# Patient Record
Sex: FEMALE | Race: WHITE | NOT HISPANIC OR LATINO | Employment: FULL TIME | URBAN - METROPOLITAN AREA
[De-identification: names, ages, dates, MRNs, and addresses within clinical notes are randomized per-mention and may not be internally consistent; named-entity substitution may affect disease eponyms.]

---

## 2022-01-15 ENCOUNTER — HOSPITAL ENCOUNTER (EMERGENCY)
Facility: CLINIC | Age: 32
End: 2022-01-15

## 2022-01-15 ENCOUNTER — HOSPITAL ENCOUNTER (EMERGENCY)
Facility: CLINIC | Age: 32
Discharge: HOME | End: 2022-01-15
Attending: EMERGENCY MEDICINE | Admitting: EMERGENCY MEDICINE
Payer: COMMERCIAL

## 2022-01-15 ENCOUNTER — APPOINTMENT (OUTPATIENT)
Dept: ULTRASOUND IMAGING | Facility: CLINIC | Age: 32
End: 2022-01-15
Attending: EMERGENCY MEDICINE
Payer: COMMERCIAL

## 2022-01-15 VITALS
DIASTOLIC BLOOD PRESSURE: 96 MMHG | HEIGHT: 62 IN | TEMPERATURE: 98 F | RESPIRATION RATE: 18 BRPM | HEART RATE: 101 BPM | WEIGHT: 145 LBS | BODY MASS INDEX: 26.68 KG/M2 | SYSTOLIC BLOOD PRESSURE: 138 MMHG | OXYGEN SATURATION: 98 %

## 2022-01-15 LAB
B-HCG SERPL-ACNC: ABNORMAL IU/L (ref 0–5)
BASOPHILS # BLD AUTO: 0.1 10E3/UL (ref 0–0.2)
BASOPHILS NFR BLD AUTO: 1 %
EOSINOPHIL # BLD AUTO: 0.1 10E3/UL (ref 0–0.7)
EOSINOPHIL NFR BLD AUTO: 1 %
ERYTHROCYTE [DISTWIDTH] IN BLOOD BY AUTOMATED COUNT: 11.9 % (ref 10–15)
HCT VFR BLD AUTO: 42.4 % (ref 35–47)
HGB BLD-MCNC: 14.6 G/DL (ref 11.7–15.7)
HOLD SPECIMEN: NORMAL
IMM GRANULOCYTES # BLD: 0.1 10E3/UL
IMM GRANULOCYTES NFR BLD: 1 %
LYMPHOCYTES # BLD AUTO: 1.9 10E3/UL (ref 0.8–5.3)
LYMPHOCYTES NFR BLD AUTO: 18 %
MCH RBC QN AUTO: 29.9 PG (ref 26.5–33)
MCHC RBC AUTO-ENTMCNC: 34.4 G/DL (ref 31.5–36.5)
MCV RBC AUTO: 87 FL (ref 78–100)
MONOCYTES # BLD AUTO: 0.5 10E3/UL (ref 0–1.3)
MONOCYTES NFR BLD AUTO: 5 %
NEUTROPHILS # BLD AUTO: 7.9 10E3/UL (ref 1.6–8.3)
NEUTROPHILS NFR BLD AUTO: 74 %
NRBC # BLD AUTO: 0 10E3/UL
NRBC BLD AUTO-RTO: 0 /100
PLATELET # BLD AUTO: 286 10E3/UL (ref 150–450)
RBC # BLD AUTO: 4.89 10E6/UL (ref 3.8–5.2)
WBC # BLD AUTO: 10.5 10E3/UL (ref 4–11)

## 2022-01-15 PROCEDURE — 99284 EMERGENCY DEPT VISIT MOD MDM: CPT | Mod: 25

## 2022-01-15 PROCEDURE — 36415 COLL VENOUS BLD VENIPUNCTURE: CPT | Performed by: EMERGENCY MEDICINE

## 2022-01-15 PROCEDURE — 84702 CHORIONIC GONADOTROPIN TEST: CPT | Performed by: EMERGENCY MEDICINE

## 2022-01-15 PROCEDURE — 85025 COMPLETE CBC W/AUTO DIFF WBC: CPT | Performed by: EMERGENCY MEDICINE

## 2022-01-15 PROCEDURE — 76801 OB US < 14 WKS SINGLE FETUS: CPT

## 2022-01-15 ASSESSMENT — MIFFLIN-ST. JEOR: SCORE: 1325.97

## 2022-01-15 NOTE — ED PROVIDER NOTES
"  History     Chief Complaint:    Vaginal Bleeding       HPI   Corinne Mclain is a 31 year old female who presents with bleeding in the first trimester of her pregnancy.  Patient states she had some bleeding couple weeks ago that resolved.  She saw her obstetrician at that time and was diagnosed with a subchorionic hemorrhage.  Patient reports her Rh type is positive.  She is very confident about that.  She denies any pain symptoms denies fevers or lightheadedness or dizziness.  This is her first pregnancy.    Allergies:  Sulfa Drugs     Medications:    No current outpatient medications on file.      Past Medical History:    No past medical history on file.    There are no problems to display for this patient.       Past Surgical History:    No past surgical history on file.     Family History:    family history is not on file.    Social History:   nonsmoker    PCP: [facility], [place]     Review of Systems   All other systems reviewed and are negative.        Physical Exam   Patient Vitals for the past 24 hrs:   BP Temp Temp src Pulse Resp SpO2 Height Weight   01/15/22 0950 (!) 143/87 98  F (36.7  C) Oral 92 18 100 % 1.575 m (5' 2\") 65.8 kg (145 lb)        Physical Exam    Gen: well appearing, in no acute distress  HENT: Mucous membranes moist, no rhinorrhea  Eyes: periorbital tissues and sclera normal   Neck: supple, no abnormal swelling  Lungs: no resp distress, speaks full sentences  CV: Regular rate  Abd: soft, nontender, nondistended, no rebound/guarding  Skin: warm, dry, well perfused, no rashes/bruising/lesions on exposed skin  Neuro: alert, mo gross motor or sensory deficits,   Psych: pleasant mood, normal affect      Emergency Department Course       Imaging:    US OB < 14 Weeks Single   Preliminary Result   IMPRESSION:    1.  Single living intrauterine gestation at 11 weeks 6 days, EDC 07/31/2022.   2.  Subchorionic hemorrhage.              Laboratory:    Labs Ordered and Resulted from Time of ED " Arrival to Time of ED Departure   HCG QUANTITATIVE PREGNANCY - Abnormal       Result Value    hCG Quantitative 102,650 (*)    CBC WITH PLATELETS AND DIFFERENTIAL    WBC Count 10.5      RBC Count 4.89      Hemoglobin 14.6      Hematocrit 42.4      MCV 87      MCH 29.9      MCHC 34.4      RDW 11.9      Platelet Count 286      % Neutrophils 74      % Lymphocytes 18      % Monocytes 5      % Eosinophils 1      % Basophils 1      % Immature Granulocytes 1      NRBCs per 100 WBC 0      Absolute Neutrophils 7.9      Absolute Lymphocytes 1.9      Absolute Monocytes 0.5      Absolute Eosinophils 0.1      Absolute Basophils 0.1      Absolute Immature Granulocytes 0.1      Absolute NRBCs 0.0     ABO AND RH        Procedures:  Interventions:    Medications - No data to display     Emergency Department Course:  Past medical records, nursing notes, and vitals reviewed.  I performed an exam of the patient and obtained history, as documented above.    I rechecked the patient. Findings and plan explained to the Patient    Impression & Plan         Medical Decision Making:  Patient presents with bleeding in the first trimester.  Painless.  Ultrasound shows subchorionic hemorrhage.  Otherwise fetus appears normal.  Patient is confident her Rh type is positive.  She has follow-up with her gynecologist this next week.  Will recommend pelvic rest for now reassurance.  Recommend return to ED with worsening pain worsening bleeding dizziness lightheadedness she is comfortable with management plan.      Diagnosis:    ICD-10-CM    1. Subchorionic hemorrhage of placenta in first trimester, single or unspecified fetus  O41.8X10     O46.8X1         Discharge Medications:  New Prescriptions    No medications on file        1/15/2022   Qasim Ying,*        Qasim Ying MD  01/15/22 0854

## 2022-01-15 NOTE — ED TRIAGE NOTES
Patient reports being 12 weeks pregnant and this AM start vaginal bleeding and passing clots. States bleeding is heavy. Denies abdominal pain.

## 2022-01-25 LAB
HEPATITIS B SURFACE ANTIGEN (EXTERNAL): NEGATIVE
HIV1+2 AB SERPL QL IA: NEGATIVE
RUBELLA ANTIBODY IGG (EXTERNAL): NORMAL

## 2022-07-08 LAB — GROUP B STREPTOCOCCUS (EXTERNAL): NEGATIVE

## 2022-07-22 ENCOUNTER — HOSPITAL ENCOUNTER (OUTPATIENT)
Age: 32
End: 2022-07-22
Payer: COMMERCIAL

## 2022-08-08 ENCOUNTER — ANESTHESIA EVENT (OUTPATIENT)
Dept: OBGYN | Facility: CLINIC | Age: 32
End: 2022-08-08
Payer: COMMERCIAL

## 2022-08-08 ENCOUNTER — HOSPITAL ENCOUNTER (INPATIENT)
Facility: CLINIC | Age: 32
LOS: 2 days | Discharge: HOME | End: 2022-08-10
Attending: OBSTETRICS & GYNECOLOGY | Admitting: OBSTETRICS & GYNECOLOGY
Payer: COMMERCIAL

## 2022-08-08 ENCOUNTER — ANESTHESIA (OUTPATIENT)
Dept: OBGYN | Facility: CLINIC | Age: 32
End: 2022-08-08
Payer: COMMERCIAL

## 2022-08-08 LAB
ABO/RH(D): NORMAL
ANTIBODY SCREEN: NEGATIVE
HGB BLD-MCNC: 13.3 G/DL (ref 11.7–15.7)
SARS-COV-2 RNA RESP QL NAA+PROBE: NEGATIVE
SPECIMEN EXPIRATION DATE: NORMAL
T PALLIDUM AB SER QL: NONREACTIVE

## 2022-08-08 PROCEDURE — 10907ZC DRAINAGE OF AMNIOTIC FLUID, THERAPEUTIC FROM PRODUCTS OF CONCEPTION, VIA NATURAL OR ARTIFICIAL OPENING: ICD-10-PCS | Performed by: OBSTETRICS & GYNECOLOGY

## 2022-08-08 PROCEDURE — 86780 TREPONEMA PALLIDUM: CPT | Performed by: OBSTETRICS & GYNECOLOGY

## 2022-08-08 PROCEDURE — 85018 HEMOGLOBIN: CPT | Performed by: OBSTETRICS & GYNECOLOGY

## 2022-08-08 PROCEDURE — 370N000003 HC ANESTHESIA WARD SERVICE

## 2022-08-08 PROCEDURE — 86901 BLOOD TYPING SEROLOGIC RH(D): CPT | Performed by: OBSTETRICS & GYNECOLOGY

## 2022-08-08 PROCEDURE — 722N000001 HC LABOR CARE VAGINAL DELIVERY SINGLE

## 2022-08-08 PROCEDURE — 3E0R3BZ INTRODUCTION OF ANESTHETIC AGENT INTO SPINAL CANAL, PERCUTANEOUS APPROACH: ICD-10-PCS | Performed by: ANESTHESIOLOGY

## 2022-08-08 PROCEDURE — 00HU33Z INSERTION OF INFUSION DEVICE INTO SPINAL CANAL, PERCUTANEOUS APPROACH: ICD-10-PCS | Performed by: ANESTHESIOLOGY

## 2022-08-08 PROCEDURE — U0003 INFECTIOUS AGENT DETECTION BY NUCLEIC ACID (DNA OR RNA); SEVERE ACUTE RESPIRATORY SYNDROME CORONAVIRUS 2 (SARS-COV-2) (CORONAVIRUS DISEASE [COVID-19]), AMPLIFIED PROBE TECHNIQUE, MAKING USE OF HIGH THROUGHPUT TECHNOLOGIES AS DESCRIBED BY CMS-2020-01-R: HCPCS | Performed by: OBSTETRICS & GYNECOLOGY

## 2022-08-08 PROCEDURE — 250N000011 HC RX IP 250 OP 636: Performed by: OBSTETRICS & GYNECOLOGY

## 2022-08-08 PROCEDURE — 120N000001 HC R&B MED SURG/OB

## 2022-08-08 PROCEDURE — 250N000011 HC RX IP 250 OP 636: Performed by: ANESTHESIOLOGY

## 2022-08-08 PROCEDURE — 86850 RBC ANTIBODY SCREEN: CPT | Performed by: OBSTETRICS & GYNECOLOGY

## 2022-08-08 PROCEDURE — 250N000009 HC RX 250: Performed by: OBSTETRICS & GYNECOLOGY

## 2022-08-08 PROCEDURE — 258N000003 HC RX IP 258 OP 636: Performed by: OBSTETRICS & GYNECOLOGY

## 2022-08-08 PROCEDURE — 3E033VJ INTRODUCTION OF OTHER HORMONE INTO PERIPHERAL VEIN, PERCUTANEOUS APPROACH: ICD-10-PCS | Performed by: OBSTETRICS & GYNECOLOGY

## 2022-08-08 RX ORDER — NALOXONE HYDROCHLORIDE 0.4 MG/ML
0.4 INJECTION, SOLUTION INTRAMUSCULAR; INTRAVENOUS; SUBCUTANEOUS
Status: DISCONTINUED | OUTPATIENT
Start: 2022-08-08 | End: 2022-08-09 | Stop reason: HOSPADM

## 2022-08-08 RX ORDER — PROCHLORPERAZINE MALEATE 10 MG
10 TABLET ORAL EVERY 6 HOURS PRN
Status: DISCONTINUED | OUTPATIENT
Start: 2022-08-08 | End: 2022-08-09 | Stop reason: HOSPADM

## 2022-08-08 RX ORDER — METOCLOPRAMIDE HYDROCHLORIDE 5 MG/ML
10 INJECTION INTRAMUSCULAR; INTRAVENOUS EVERY 6 HOURS PRN
Status: DISCONTINUED | OUTPATIENT
Start: 2022-08-08 | End: 2022-08-09 | Stop reason: HOSPADM

## 2022-08-08 RX ORDER — FENTANYL CITRATE-0.9 % NACL/PF 10 MCG/ML
100 PLASTIC BAG, INJECTION (ML) INTRAVENOUS EVERY 5 MIN PRN
Status: DISCONTINUED | OUTPATIENT
Start: 2022-08-08 | End: 2022-08-09 | Stop reason: HOSPADM

## 2022-08-08 RX ORDER — SODIUM CHLORIDE, SODIUM LACTATE, POTASSIUM CHLORIDE, CALCIUM CHLORIDE 600; 310; 30; 20 MG/100ML; MG/100ML; MG/100ML; MG/100ML
INJECTION, SOLUTION INTRAVENOUS CONTINUOUS
Status: DISCONTINUED | OUTPATIENT
Start: 2022-08-08 | End: 2022-08-09 | Stop reason: HOSPADM

## 2022-08-08 RX ORDER — CITRIC ACID/SODIUM CITRATE 334-500MG
30 SOLUTION, ORAL ORAL
Status: DISCONTINUED | OUTPATIENT
Start: 2022-08-08 | End: 2022-08-09 | Stop reason: HOSPADM

## 2022-08-08 RX ORDER — IBUPROFEN 800 MG/1
800 TABLET, FILM COATED ORAL
Status: DISCONTINUED | OUTPATIENT
Start: 2022-08-08 | End: 2022-08-09

## 2022-08-08 RX ORDER — OXYTOCIN/0.9 % SODIUM CHLORIDE 30/500 ML
340 PLASTIC BAG, INJECTION (ML) INTRAVENOUS CONTINUOUS PRN
Status: DISCONTINUED | OUTPATIENT
Start: 2022-08-08 | End: 2022-08-09 | Stop reason: HOSPADM

## 2022-08-08 RX ORDER — ONDANSETRON 2 MG/ML
4 INJECTION INTRAMUSCULAR; INTRAVENOUS EVERY 6 HOURS PRN
Status: DISCONTINUED | OUTPATIENT
Start: 2022-08-08 | End: 2022-08-09 | Stop reason: HOSPADM

## 2022-08-08 RX ORDER — NALBUPHINE HYDROCHLORIDE 10 MG/ML
2.5-5 INJECTION, SOLUTION INTRAMUSCULAR; INTRAVENOUS; SUBCUTANEOUS EVERY 6 HOURS PRN
Status: DISCONTINUED | OUTPATIENT
Start: 2022-08-08 | End: 2022-08-09

## 2022-08-08 RX ORDER — MISOPROSTOL 200 UG/1
400 TABLET ORAL
Status: DISCONTINUED | OUTPATIENT
Start: 2022-08-08 | End: 2022-08-09 | Stop reason: HOSPADM

## 2022-08-08 RX ORDER — NALOXONE HYDROCHLORIDE 0.4 MG/ML
0.2 INJECTION, SOLUTION INTRAMUSCULAR; INTRAVENOUS; SUBCUTANEOUS
Status: DISCONTINUED | OUTPATIENT
Start: 2022-08-08 | End: 2022-08-09 | Stop reason: HOSPADM

## 2022-08-08 RX ORDER — SODIUM CHLORIDE, SODIUM LACTATE, POTASSIUM CHLORIDE, CALCIUM CHLORIDE 600; 310; 30; 20 MG/100ML; MG/100ML; MG/100ML; MG/100ML
INJECTION, SOLUTION INTRAVENOUS CONTINUOUS PRN
Status: DISCONTINUED | OUTPATIENT
Start: 2022-08-08 | End: 2022-08-09 | Stop reason: HOSPADM

## 2022-08-08 RX ORDER — KETOROLAC TROMETHAMINE 30 MG/ML
30 INJECTION, SOLUTION INTRAMUSCULAR; INTRAVENOUS
Status: DISCONTINUED | OUTPATIENT
Start: 2022-08-08 | End: 2022-08-09

## 2022-08-08 RX ORDER — CARBOPROST TROMETHAMINE 250 UG/ML
250 INJECTION, SOLUTION INTRAMUSCULAR
Status: DISCONTINUED | OUTPATIENT
Start: 2022-08-08 | End: 2022-08-09 | Stop reason: HOSPADM

## 2022-08-08 RX ORDER — FENTANYL CITRATE 50 UG/ML
100 INJECTION, SOLUTION INTRAMUSCULAR; INTRAVENOUS
Status: DISCONTINUED | OUTPATIENT
Start: 2022-08-08 | End: 2022-08-09 | Stop reason: HOSPADM

## 2022-08-08 RX ORDER — LIDOCAINE 40 MG/G
CREAM TOPICAL
Status: DISCONTINUED | OUTPATIENT
Start: 2022-08-08 | End: 2022-08-09 | Stop reason: HOSPADM

## 2022-08-08 RX ORDER — OXYTOCIN/0.9 % SODIUM CHLORIDE 30/500 ML
100-340 PLASTIC BAG, INJECTION (ML) INTRAVENOUS CONTINUOUS PRN
Status: DISCONTINUED | OUTPATIENT
Start: 2022-08-08 | End: 2022-08-09

## 2022-08-08 RX ORDER — OXYTOCIN 10 [USP'U]/ML
10 INJECTION, SOLUTION INTRAMUSCULAR; INTRAVENOUS
Status: DISCONTINUED | OUTPATIENT
Start: 2022-08-08 | End: 2022-08-09 | Stop reason: HOSPADM

## 2022-08-08 RX ORDER — OXYTOCIN 10 [USP'U]/ML
10 INJECTION, SOLUTION INTRAMUSCULAR; INTRAVENOUS
Status: DISCONTINUED | OUTPATIENT
Start: 2022-08-08 | End: 2022-08-09

## 2022-08-08 RX ORDER — MISOPROSTOL 200 UG/1
800 TABLET ORAL
Status: DISCONTINUED | OUTPATIENT
Start: 2022-08-08 | End: 2022-08-09 | Stop reason: HOSPADM

## 2022-08-08 RX ORDER — PROCHLORPERAZINE 25 MG
25 SUPPOSITORY, RECTAL RECTAL EVERY 12 HOURS PRN
Status: DISCONTINUED | OUTPATIENT
Start: 2022-08-08 | End: 2022-08-09 | Stop reason: HOSPADM

## 2022-08-08 RX ORDER — TRANEXAMIC ACID 10 MG/ML
1 INJECTION, SOLUTION INTRAVENOUS EVERY 30 MIN PRN
Status: DISCONTINUED | OUTPATIENT
Start: 2022-08-08 | End: 2022-08-09 | Stop reason: HOSPADM

## 2022-08-08 RX ORDER — METHYLERGONOVINE MALEATE 0.2 MG/ML
200 INJECTION INTRAVENOUS
Status: DISCONTINUED | OUTPATIENT
Start: 2022-08-08 | End: 2022-08-09 | Stop reason: HOSPADM

## 2022-08-08 RX ORDER — OXYTOCIN/0.9 % SODIUM CHLORIDE 30/500 ML
1-24 PLASTIC BAG, INJECTION (ML) INTRAVENOUS CONTINUOUS
Status: DISCONTINUED | OUTPATIENT
Start: 2022-08-08 | End: 2022-08-09 | Stop reason: HOSPADM

## 2022-08-08 RX ORDER — ONDANSETRON 4 MG/1
4 TABLET, ORALLY DISINTEGRATING ORAL EVERY 6 HOURS PRN
Status: DISCONTINUED | OUTPATIENT
Start: 2022-08-08 | End: 2022-08-09 | Stop reason: HOSPADM

## 2022-08-08 RX ORDER — PRENATAL VIT/IRON FUM/FOLIC AC 27MG-0.8MG
1 TABLET ORAL DAILY
COMMUNITY

## 2022-08-08 RX ORDER — METOCLOPRAMIDE 10 MG/1
10 TABLET ORAL EVERY 6 HOURS PRN
Status: DISCONTINUED | OUTPATIENT
Start: 2022-08-08 | End: 2022-08-09 | Stop reason: HOSPADM

## 2022-08-08 RX ADMIN — Medication: at 21:04

## 2022-08-08 RX ADMIN — ONDANSETRON 4 MG: 2 INJECTION INTRAMUSCULAR; INTRAVENOUS at 20:38

## 2022-08-08 RX ADMIN — SODIUM CHLORIDE, POTASSIUM CHLORIDE, SODIUM LACTATE AND CALCIUM CHLORIDE: 600; 310; 30; 20 INJECTION, SOLUTION INTRAVENOUS at 15:41

## 2022-08-08 RX ADMIN — Medication: at 14:35

## 2022-08-08 RX ADMIN — FENTANYL CITRATE 100 MCG: 50 INJECTION, SOLUTION INTRAMUSCULAR; INTRAVENOUS at 13:15

## 2022-08-08 RX ADMIN — SODIUM CHLORIDE, POTASSIUM CHLORIDE, SODIUM LACTATE AND CALCIUM CHLORIDE: 600; 310; 30; 20 INJECTION, SOLUTION INTRAVENOUS at 08:55

## 2022-08-08 RX ADMIN — ONDANSETRON 4 MG: 2 INJECTION INTRAMUSCULAR; INTRAVENOUS at 14:34

## 2022-08-08 RX ADMIN — Medication 2 MILLI-UNITS/MIN: at 08:50

## 2022-08-08 ASSESSMENT — ACTIVITIES OF DAILY LIVING (ADL)
ADLS_ACUITY_SCORE: 20

## 2022-08-08 NOTE — ANESTHESIA PREPROCEDURE EVALUATION
Anesthesia Pre-Procedure Evaluation    Patient: Corinne Mclain   MRN: [id number] : 1990        Procedure :           History reviewed. No pertinent past medical history.   History reviewed. No pertinent surgical history.   Allergies   Allergen Reactions     Sulfa Drugs Hives      Social History     Tobacco Use     Smoking status: Never Smoker     Smokeless tobacco: Never Used   Substance Use Topics     Alcohol use: Never      Wt Readings from Last 1 Encounters:   01/15/22 65.8 kg (145 lb)        Anesthesia Evaluation            ROS/MED HX  ENT/Pulmonary:  - neg pulmonary ROS     Neurologic:       Cardiovascular:  - neg cardiovascular ROS     METS/Exercise Tolerance:     Hematologic:       Musculoskeletal:       GI/Hepatic:       Renal/Genitourinary:       Endo:       Psychiatric/Substance Use:       Infectious Disease:       Malignancy:       Other:    41w0d with Estimated Date of Delivery: Aug 1, 2022 who presents for post dates induction            OUTSIDE LABS:  CBC:   Lab Results   Component Value Date    WBC 10.5 01/15/2022    HGB 13.3 2022    HGB 14.6 01/15/2022    HCT 42.4 01/15/2022     01/15/2022     BMP: No results found for: NA, POTASSIUM, CHLORIDE, CO2, BUN, CR, GLC  COAGS: No results found for: PTT, INR, FIBR  POC: No results found for: BGM, HCG, HCGS  HEPATIC: No results found for: ALBUMIN, PROTTOTAL, ALT, AST, GGT, ALKPHOS, BILITOTAL, BILIDIRECT, REJI  OTHER: No results found for: PH, LACT, A1C, ALXEEI, PHOS, MAG, LIPASE, AMYLASE, TSH, T4, T3, CRP, SED    Anesthesia Plan    ASA Status:  2      Anesthesia Type: Epidural.              Consents    Anesthesia Plan(s) and associated risks, benefits, and realistic alternatives discussed. Questions answered and patient/representative(s) expressed understanding.    - Discussed:     - Discussed with:  Patient         Postoperative Care            Comments:    Other Comments: Continuous Labor Epidural: Indication is for labor pain.  Following an discussion of the procedure, epidural expectations, and risks and benefits (risks including, but not limited to, nerve damage, infection, bleeding/hematoma, inadvertent dural puncture, spinal headache, partial or failed block), the patient appears to understand and consents to proceed. Questions were encouraged and answered.             Deandra Morales MD

## 2022-08-08 NOTE — PLAN OF CARE
arrives to floor for medical induction for postdates. Pt states baby is active. Denies LOF, vaginal bleeding, and contractions. Monitors applied, admission information gathered, assessment performed and IV started. Will call MD for further orders.

## 2022-08-08 NOTE — PROVIDER NOTIFICATION
08/08/22 1300   Provider Notification   Provider Name/Title Dr Perdue   Method of Notification In Department   Request Evaluate - Remote   Notification Reason Status Update     MD in department. Updated on FHR tracing, pt comfort level and pitocin. Orders to recheck pt at 1430 or after epidural placement. Call if needed.

## 2022-08-08 NOTE — PROVIDER NOTIFICATION
08/08/22 0820   Provider Notification   Provider Name/Title Dr Perdue   Method of Notification At Bedside   Request Evaluate in Person   Notification Reason Patient Arrived     MD at bedside. Reviewed FHR tracing. SVE performed by MD 3/80/-2. AROM clear fluid. Intrapartum orders received MD ok with nitrous, fentanyl or epidural at anytime. Orders to start pitocin. MD updated on admission BP no new orders. MD is in house today, call when needed

## 2022-08-08 NOTE — PROVIDER NOTIFICATION
08/08/22 1740   Provider Notification   Provider Name/Title Dr Perdue   Method of Notification In Department   Request Evaluate in Person   Notification Reason Status Update     MD in department, notified of SVE and bloody show. Orders to check pt again when she is feeling pressure or if indicated due to FHR tracing. Updated of decel when trying to flip pt to right side, so we flipped her back to left side. Ok to keep pt on right side. MD is in department, call when needed

## 2022-08-08 NOTE — PROVIDER NOTIFICATION
08/08/22 1620   Provider Notification   Provider Name/Title Dr Perdue   Method of Notification At Bedside   Request Evaluate in Person       MD called to bedside to assess vaginal bleeding. Reviewed FHR tracing and bleeding.   No new orders, continue to monitor.

## 2022-08-08 NOTE — H&P
[facility]     History and Physical  Obstetrics and Gynecology     Date of Admission:  2022    History of Present Illness   Corinne Mclain is a 31 year old female  41w0d with Estimated Date of Delivery: Aug 1, 2022 who presents for post dates induction.    [facility].  Indication post dates.    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  Rhogam not indicated   No lab results found.    Past Medical History    I have reviewed this patient's medical history and updated it with pertinent information if needed.   History reviewed. No pertinent past medical history.    Past Surgical History   I have reviewed this patient's surgical history and updated it with pertinent information if needed.  History reviewed. No pertinent surgical history.    Prior to Admission Medications   Prior to Admission Medications   Prescriptions Last Dose Informant Patient Reported? Taking?   Prenatal Vit-Fe Fumarate-FA (PRENATAL MULTIVITAMIN W/IRON) 27-0.8 MG tablet 2022 at Unknown time  Yes Yes   Sig: Take 1 tablet by mouth daily      Facility-Administered Medications: None     Allergies   Allergies   Allergen Reactions     Sulfa Drugs Hives       Social History   I have personally reviewed the social history.    Family History   Family history reviewed with patient and is noncontributory.    Immunization History   Immunizations are up to date  S/p flu 10/20/21, Tdap 22, anc COVID vacc      Physical Exam   Temp: 98.5  F (36.9  C) Temp src: Oral BP: (!) 138/91     Resp: 16        Vital Signs with Ranges  Temp:  [98.5  F (36.9  C)] 98.5  F (36.9  C)  Resp:  [16] 16  BP: (138)/(91) 138/91  Fetal Heart Tones: 135 baseline, moderate variablility, + accels, no decels and Category  I  TOCO: none    Constitutional: healthy, alert and active   Respiratory: No increased work of breathing, good air exchange, clear to auscultation bilaterally, no crackles or wheezing  Cardiovascular: Normal apical impulse, regular rate and rhythm, normal S1 and S2, no S3 or S4, and no murmur noted  Abdomen: gravid, single vertex fetus, non-tender, EFW 7 lbs 10  Cervical Exam: 3/ 80/ Posterior/ soft/ -2       Assessment & Plan   Corinne Mclain is a 31 year old female  who presents at 41w0d for post dates induction.   GBS negative.   NST reactive.  Category  I.     Prenatal Care:  - OB labs reviewed: A, Rubella immune, Heb B AG non reactive   - Genetics: declined  - Anatomy ultrasound: normal with minimally complex left ovarian cyst  - Rh positive, Rhogam not indicated  - GCT failed passed 2hr GTT, with hgb 11.9, RPR  - S/p flu 10/20/21, Tdap 22, anc COVID vacc  - GBS neg  - Feed: breast (script given)  - Contraception: Mirena  - IOL scheduled for     Fetal mid-gut herniation  - Resolved    Familial chromosomal anomaly  - Declined genetic screening       Admit - see IP orders  Labor induction with Pitocin and AROM for clear fluid this am  Pain medication plans on epidural  Anticipate     Chaparrita Perdue, DO, DO

## 2022-08-08 NOTE — PROVIDER NOTIFICATION
08/08/22 1805   Provider Notification   Provider Name/Title Dr Perdue   Method of Notification In Department   Request Evaluate - Remote   Notification Reason Status Update     MD in department. Reviewed FHR and uterine tracing. Orders to turn pitocin off.

## 2022-08-08 NOTE — PROVIDER NOTIFICATION
08/08/22 1454   Provider Notification   Provider Name/Title Dr Perdue   Method of Notification In Department   Request Evaluate - Remote   Notification Reason Status Update     MD in department. Updated that pt got epidural and plan is to recheck SVE and place mariee in 20 minutes. Orders to place IUPC if SVE is unchanged.

## 2022-08-08 NOTE — PROGRESS NOTES
[facility]   OB PROGRESS NOTE     S. Pt states she is doing well overall and comfortable with epidural . +FM    BP (!) 145/85   Temp 98.1  F (36.7  C) (Oral)   Resp 16   SpO2 99%     Fetal Heart Tones: 115 baseline, moderate variablility, + accels and early decelerations  TOCO: frequency q 2-3 minutes   Cervical Exam: 9/100/0    A/P Corinne Mclain is a 31 year old  at 41w0d here with     1- post-dates induction  - due to tachysystole will stop the pitocin    - FHT Cat I  - continue monitoring    Dr. Chaparrita Perdue DO

## 2022-08-08 NOTE — ANESTHESIA PROCEDURE NOTES
Epidural catheter Procedure Note    Pre-Procedure   Staff -        Anesthesiologist:  Deandra Morales MD       Performed By: anesthesiologist       Referred By: Nette       Location: OB       Pre-Anesthestic Checklist: patient identified, IV checked, risks and benefits discussed, informed consent, monitors and equipment checked, pre-op evaluation and at physician/surgeon's request  Timeout:       Correct Patient: Yes        Correct Procedure: Yes        Correct Site: Yes        Correct Position: Yes   Procedure Documentation  Procedure: epidural catheter       Diagnosis: Labor pain       Patient Position: sitting       Skin prep: Chloraprep       Local skin infiltrated with 2 mL of 1% lidocaine.        Insertion Site: L4-5. (midline approach).       Technique: LORT saline        TINY at 5 cm.       Needle Type: Touhy needle       Needle Gauge: 17.        Needle Length (Inches): 3.5        Catheter: 19 G.          Catheter threaded easily.         5 cm epidural space.         Threaded 10 cm at skin.         # of attempts: 1 and  # of redirects:  0    Assessment/Narrative         Paresthesias: No.       Insertion/Infusion Method: LORT saline       Aspiration negative for Heme or CSF via Epidural Catheter.     Comments:  Procedure tolerated well without apparent complications. Initial MDA bolus of 0.125% bupivacaine + fentanyl from pump was given without difficulty or complications. Epidural infusion (0.125% bupi with fentanyl 2mcg/ml) started at 10 ml/hr with PCEA of 5 ml q15min with a max cumulative dose of 25 ml/hr. PCEA instructions given and use encouraged PRN. Epidural expectations again reviewed and questions answered. Patient hemodynamically stable.  Patient and epidural functionality to be reassessed later via vital sign flowsheet, nursing communication, and/or patient report.  Anesthesiologist immediately available for ongoing assessment and management.

## 2022-08-09 PROCEDURE — 722N000001 HC LABOR CARE VAGINAL DELIVERY SINGLE

## 2022-08-09 PROCEDURE — 999N000079 HC STATISTIC IP LACTATION SERVICES 1-15 MIN

## 2022-08-09 PROCEDURE — 250N000013 HC RX MED GY IP 250 OP 250 PS 637: Performed by: OBSTETRICS & GYNECOLOGY

## 2022-08-09 PROCEDURE — 0KQM0ZZ REPAIR PERINEUM MUSCLE, OPEN APPROACH: ICD-10-PCS | Performed by: OBSTETRICS & GYNECOLOGY

## 2022-08-09 PROCEDURE — 120N000001 HC R&B MED SURG/OB

## 2022-08-09 PROCEDURE — 250N000011 HC RX IP 250 OP 636: Performed by: OBSTETRICS & GYNECOLOGY

## 2022-08-09 RX ORDER — METHYLERGONOVINE MALEATE 0.2 MG/ML
200 INJECTION INTRAVENOUS
Status: DISCONTINUED | OUTPATIENT
Start: 2022-08-09 | End: 2022-08-10 | Stop reason: HOSPADM

## 2022-08-09 RX ORDER — HYDROCORTISONE 25 MG/G
CREAM TOPICAL 3 TIMES DAILY PRN
Status: DISCONTINUED | OUTPATIENT
Start: 2022-08-09 | End: 2022-08-10 | Stop reason: HOSPADM

## 2022-08-09 RX ORDER — DOCUSATE SODIUM 100 MG/1
100 CAPSULE, LIQUID FILLED ORAL DAILY
Status: DISCONTINUED | OUTPATIENT
Start: 2022-08-09 | End: 2022-08-10 | Stop reason: HOSPADM

## 2022-08-09 RX ORDER — MISOPROSTOL 200 UG/1
800 TABLET ORAL
Status: DISCONTINUED | OUTPATIENT
Start: 2022-08-09 | End: 2022-08-10 | Stop reason: HOSPADM

## 2022-08-09 RX ORDER — BISACODYL 10 MG
10 SUPPOSITORY, RECTAL RECTAL DAILY PRN
Status: DISCONTINUED | OUTPATIENT
Start: 2022-08-09 | End: 2022-08-10 | Stop reason: HOSPADM

## 2022-08-09 RX ORDER — MODIFIED LANOLIN
OINTMENT (GRAM) TOPICAL
Status: DISCONTINUED | OUTPATIENT
Start: 2022-08-09 | End: 2022-08-10 | Stop reason: HOSPADM

## 2022-08-09 RX ORDER — CARBOPROST TROMETHAMINE 250 UG/ML
250 INJECTION, SOLUTION INTRAMUSCULAR
Status: DISCONTINUED | OUTPATIENT
Start: 2022-08-09 | End: 2022-08-10 | Stop reason: HOSPADM

## 2022-08-09 RX ORDER — PRENATAL VIT/IRON FUM/FOLIC AC 27MG-0.8MG
1 TABLET ORAL DAILY
Status: DISCONTINUED | OUTPATIENT
Start: 2022-08-09 | End: 2022-08-10 | Stop reason: HOSPADM

## 2022-08-09 RX ORDER — ACETAMINOPHEN 325 MG/1
650 TABLET ORAL EVERY 4 HOURS PRN
Status: DISCONTINUED | OUTPATIENT
Start: 2022-08-09 | End: 2022-08-10 | Stop reason: HOSPADM

## 2022-08-09 RX ORDER — OXYTOCIN 10 [USP'U]/ML
10 INJECTION, SOLUTION INTRAMUSCULAR; INTRAVENOUS
Status: DISCONTINUED | OUTPATIENT
Start: 2022-08-09 | End: 2022-08-10 | Stop reason: HOSPADM

## 2022-08-09 RX ORDER — OXYTOCIN/0.9 % SODIUM CHLORIDE 30/500 ML
340 PLASTIC BAG, INJECTION (ML) INTRAVENOUS CONTINUOUS PRN
Status: DISCONTINUED | OUTPATIENT
Start: 2022-08-09 | End: 2022-08-10 | Stop reason: HOSPADM

## 2022-08-09 RX ORDER — IBUPROFEN 800 MG/1
800 TABLET, FILM COATED ORAL EVERY 6 HOURS PRN
Status: DISCONTINUED | OUTPATIENT
Start: 2022-08-09 | End: 2022-08-10 | Stop reason: HOSPADM

## 2022-08-09 RX ORDER — TRANEXAMIC ACID 10 MG/ML
1 INJECTION, SOLUTION INTRAVENOUS EVERY 30 MIN PRN
Status: DISCONTINUED | OUTPATIENT
Start: 2022-08-09 | End: 2022-08-10 | Stop reason: HOSPADM

## 2022-08-09 RX ORDER — MISOPROSTOL 200 UG/1
400 TABLET ORAL
Status: DISCONTINUED | OUTPATIENT
Start: 2022-08-09 | End: 2022-08-10 | Stop reason: HOSPADM

## 2022-08-09 RX ADMIN — KETOROLAC TROMETHAMINE 30 MG: 30 INJECTION, SOLUTION INTRAMUSCULAR at 00:05

## 2022-08-09 RX ADMIN — ACETAMINOPHEN 650 MG: 325 TABLET, FILM COATED ORAL at 21:42

## 2022-08-09 RX ADMIN — IBUPROFEN 800 MG: 800 TABLET, FILM COATED ORAL at 09:30

## 2022-08-09 RX ADMIN — PRENATAL VITAMINS-IRON FUMARATE 27 MG IRON-FOLIC ACID 0.8 MG TABLET 1 TABLET: at 09:30

## 2022-08-09 RX ADMIN — ACETAMINOPHEN 650 MG: 325 TABLET, FILM COATED ORAL at 06:02

## 2022-08-09 RX ADMIN — ACETAMINOPHEN 650 MG: 325 TABLET, FILM COATED ORAL at 14:33

## 2022-08-09 RX ADMIN — IBUPROFEN 800 MG: 800 TABLET, FILM COATED ORAL at 17:29

## 2022-08-09 RX ADMIN — DOCUSATE SODIUM 100 MG: 100 CAPSULE, LIQUID FILLED ORAL at 09:30

## 2022-08-09 ASSESSMENT — ACTIVITIES OF DAILY LIVING (ADL)
ADLS_ACUITY_SCORE: 20

## 2022-08-09 NOTE — PLAN OF CARE
Data: Corinne Mclain transferred to 446 via wheelchair at 0220. Baby transferred via parent's arms.  Action: Receiving unit notified of transfer: Yes. Patient and family notified of room change. Report given to Vanessa PEREZ at 0223. Belongings sent to receiving unit. Accompanied by Registered Nurse. Oriented patient to surroundings. Call light within reach. ID bands double-checked with Vanessa PEREZ.  Response: Patient tolerated transfer and is stable. Fall risk band active.    Patients mobililty level scored using the bedside mobility assistance tool (BMAT). Patient is at a mobility level test number: 4. Mobility equipment used: none required. Required assist of 1 staff members. Further use of BMAT scoring required.

## 2022-08-09 NOTE — PROVIDER NOTIFICATION
08/08/22 2200   Provider Notification   Provider Name/Title Dr. Perdue   Method of Notification At Bedside   Request Evaluate in Person     MD at bedside to evaluate pushing

## 2022-08-09 NOTE — LACTATION NOTE
Lactation visit at time of a feed. Baby cueing, latched on left side with full assist. Deep latch noted, baby nursed for a short period then would not re latch despite burping. Baby placed STS. Educated on first 24 hour and beyond feeding behavior. Basic breastfeeding reviewed. Encouraged to call out for assistance.

## 2022-08-09 NOTE — PLAN OF CARE
VSS. Pain well managed with ibuprofen and Tylenol. Ambulating and voiding without difficulty. Tolerating oral intake.  Breastfeeding infant with some assistance. Encouraged to call out for assistance as needed with feeds and infant cares.  SO at bedside and supportive. Positive bonding behaviors observed.      Patients mobililty level scored using the bedside mobility assistance tool (BMAT). Patient is at a mobility level test number: 4. Mobility equipment used: none required. Required assist of 0 staff members. Further use of BMAT scoring not required.

## 2022-08-09 NOTE — PROVIDER NOTIFICATION
08/08/22 2046   Provider Notification   Provider Name/Title dr. Perdue   Method of Notification At Bedside   Request Evaluate in Person     MD at bedside to evaluate pushing

## 2022-08-09 NOTE — ANESTHESIA POSTPROCEDURE EVALUATION
Patient: Corinne Mclain    Procedure: * No procedures listed *       Anesthesia Type:  Epidural    Note:  Disposition: Inpatient   Postop Pain Control: Uneventful            Sign Out: Well controlled pain   PONV: No   Neuro/Psych: Uneventful            Sign Out: Acceptable/Baseline neuro status   Airway/Respiratory: Uneventful            Sign Out: Acceptable/Baseline resp. status   CV/Hemodynamics: Uneventful            Sign Out: Acceptable CV status   Other NRE: NONE   DID A NON-ROUTINE EVENT OCCUR? No    Event details/Postop Comments:    Patient doing well.  Residual neuraxial block resolved with no reported numbness or paresthesias.  Ambulating, voiding, and eating without difficulty. Denies positional headache.  Pain well controlled. No bowel or bladder changes. Minimal back discomfort at epidural site. No apparent epidural complications.  Questions encouraged and answered.             Last vitals:  Vitals:    08/09/22 0150 08/09/22 0545 08/09/22 0932   BP: 115/77 109/72 119/84   Resp:  18 18   Temp:  98.1  F (36.7  C) 98.4  F (36.9  C)   SpO2:          Electronically Signed By: Neto Guerrero MD  August 9, 2022  2:40 PM

## 2022-08-09 NOTE — L&D DELIVERY NOTE
Corinne Mclain is a 31 year old-year-old  female,  1 para 0 with  EDC 22, who was admitted for post-dates induction at 41 weeks gestation.    Her prenatal care was at the [facility] in [place]. 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 Vaginal Group B Streptococcus culture was negative.  Patient underwent artificial rupture of membranes at 0850, yielding clear fluid.  Oxytocin induction was initiated per standard protocol for absent/inadequate labor.  Patient received an epidural/narcotic injection for pain relief.  The patient achieved complete dilation at 1957. She went on to deliver a  male infant at 2344 by . Apgars were  8 at one minute and 9 at five minutes. The fetal oropharynx was bulb suctioned on the perineum.  There was one nuchal cord that was delivered through. The placenta delivered spontaneously and intact at 2347.  The patient had a second degree. This was repaired with #3-0.  EBL for the delivery was 367 cc.  Dr. Chaparrita Perdue DO

## 2022-08-09 NOTE — PROVIDER NOTIFICATION
08/08/22 1945   Provider Notification   Provider Name/Title Dr. Perdue   Method of Notification Phone   Request Evaluate - Remote   Notification Reason Status Update     MD called with orders to do SVE now and start pushing if pt is complete.

## 2022-08-09 NOTE — PLAN OF CARE
Pt s/p .  VS and assessment WNL.  Pt voiding without difficulty. Pain well managed. Breasfeeding infant with minimal assist.  Positive bonding observed with infant.  Pt spouse at bedside and supportive of couplet.  Stable; care plan is up to date.

## 2022-08-10 VITALS
OXYGEN SATURATION: 100 % | DIASTOLIC BLOOD PRESSURE: 87 MMHG | TEMPERATURE: 97.4 F | HEART RATE: 76 BPM | SYSTOLIC BLOOD PRESSURE: 113 MMHG | RESPIRATION RATE: 16 BRPM

## 2022-08-10 PROCEDURE — 250N000013 HC RX MED GY IP 250 OP 250 PS 637: Performed by: OBSTETRICS & GYNECOLOGY

## 2022-08-10 RX ORDER — ACETAMINOPHEN 325 MG/1
650 TABLET ORAL EVERY 4 HOURS PRN
COMMUNITY
Start: 2022-08-10

## 2022-08-10 RX ORDER — IBUPROFEN 800 MG/1
800 TABLET, FILM COATED ORAL EVERY 6 HOURS PRN
Qty: 60 TABLET | Refills: 0 | Status: SHIPPED | OUTPATIENT
Start: 2022-08-10

## 2022-08-10 RX ORDER — DOCUSATE SODIUM 100 MG/1
100 CAPSULE, LIQUID FILLED ORAL DAILY
Qty: 30 CAPSULE | Refills: 0 | Status: SHIPPED | OUTPATIENT
Start: 2022-08-11

## 2022-08-10 RX ADMIN — IBUPROFEN 800 MG: 800 TABLET, FILM COATED ORAL at 01:41

## 2022-08-10 RX ADMIN — ACETAMINOPHEN 650 MG: 325 TABLET, FILM COATED ORAL at 06:56

## 2022-08-10 RX ADMIN — DOCUSATE SODIUM 100 MG: 100 CAPSULE, LIQUID FILLED ORAL at 08:04

## 2022-08-10 RX ADMIN — IBUPROFEN 800 MG: 800 TABLET, FILM COATED ORAL at 08:04

## 2022-08-10 RX ADMIN — PRENATAL VITAMINS-IRON FUMARATE 27 MG IRON-FOLIC ACID 0.8 MG TABLET 1 TABLET: at 08:04

## 2022-08-10 RX ADMIN — ACETAMINOPHEN 650 MG: 325 TABLET, FILM COATED ORAL at 11:51

## 2022-08-10 ASSESSMENT — ACTIVITIES OF DAILY LIVING (ADL)
ADLS_ACUITY_SCORE: 20

## 2022-08-10 NOTE — DISCHARGE INSTRUCTIONS
Postpartum Vaginal Delivery Instructions    FOLLOW UP WITH YOUR OB PROVIDER AT 6 WEEKS POSTPARTUM.     Activity     Ask family and friends for help when you need it.  Do not place anything in your vagina for 6 weeks.  You are not restricted on other activities, but take it easy for a few weeks to allow your body to recover from delivery.  You are able to do any activities you feel up to that point.  No driving until you have stopped taking your pain medications (usually two weeks after delivery).     Call your health care provider if you have any of these symptoms:     Increased pain, swelling, redness, or fluid around your stiches from an episiotomy or perineal tear.  A fever above 100.4 F (38 C) with or without chills when placing a thermometer under your tongue.  You soak a sanitary pad with blood within 1 hour, or you see blood clots larger than a golf ball.  Bleeding that lasts more than 6 weeks.  Vaginal discharge that smells bad.  Severe pain, cramping or tenderness in your lower belly area.  A need to urinate more frequently (use the toilet more often), more urgently (use the toilet very quickly), or it burns when you urinate.  Nausea and vomiting.  Redness, swelling or pain around a vein in your leg.  Problems breastfeeding or a red or painful area on your breast.  Chest pain and cough or are gasping for air.  Problems coping with sadness, anxiety, or depression.  If you have any concerns about hurting yourself or the baby, call your provider immediately.   You have questions or concerns after you return home.     Keep your hands clean:  Always wash your hands before touching your perineal area and stitches.  This helps reduce your risk of infection.  If your hands aren't dirty, you may use an alcohol hand-rub to clean your hands. Keep your nails clean and short.

## 2022-08-10 NOTE — DISCHARGE SUMMARY
[facility] Discharge Summary    Corinne Mclain MRN# [id number]   Age: 31 year old YOB: 1990     Date of Admission:  2022  Date of Discharge::  8/10/2022  Admitting Physician:  Chaparrita Perdue, DO  Discharge Physician:  Shayy Erazo MD          Admission Diagnoses:   - IUP at 41w0d  - IOL for late term  - Fetal mid-gut herniation, resolved  - Familial chromosomal anomaly          Discharge Diagnosis:     -IUP at 41w0d, now delivered          Procedures:     Procedure(s): -            Medications Prior to Admission:     Medications Prior to Admission   Medication Sig Dispense Refill Last Dose     Prenatal Vit-Fe Fumarate-FA (PRENATAL MULTIVITAMIN W/IRON) 27-0.8 MG tablet Take 1 tablet by mouth daily   2022 at Unknown time             Discharge Medications:     Current Discharge Medication List      CONTINUE these medications which have NOT CHANGED    Details   Prenatal Vit-Fe Fumarate-FA (PRENATAL MULTIVITAMIN W/IRON) 27-0.8 MG tablet Take 1 tablet by mouth daily                  Brief Admission History   Corinne Mclain is a 31 year old female  41w0d with Estimated Date of Delivery: Aug 1, 2022 who presents for post dates induction.     She [facility] for induction of labor.  Indication post dates.     PRENATAL COURSE  Prenatal care was received at [facility] and the  course was complicated by resolved fetal mid gut herniation and familial chromosomal anomaly       Corinne Mclain is a 31 year old female  who presents at 41w0d for post dates induction.   GBS negative.   NST reactive.  Category  I.      Prenatal Care:  - OB labs reviewed: A, Rubella immune, Heb B AG non reactive   - Genetics: declined  - Anatomy ultrasound: normal with minimally complex left ovarian cyst  - Rh positive, Rhogam not indicated  - GCT failed passed 2hr GTT, with hgb 11.9, RPR  - S/p flu 10/20/21, Tdap 22, anc COVID vacc  - GBS  neg  - Feed: breast (script given)  - Contraception: Mirena  - IOL scheduled for     Fetal mid-gut herniation  - Resolved    Familial chromosomal anomaly  - Declined genetic screening         Admit - see IP orders  Labor induction with Pitocin and AROM for clear fluid this am  Pain medication plans on epidural  Anticipate          Brief Intrapartum Course:   Corinne Mclain is a 31 year old-year-old  female,  1 para 0 with  EDC 22, who was admitted for post-dates induction at 41 weeks gestation.    Her prenatal care was at the [facility] in [place]. 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 Vaginal Group B Streptococcus culture was negative.  Patient underwent artificial rupture of membranes at 0850, yielding clear fluid.  Oxytocin induction was initiated per standard protocol for absent/inadequate labor.  Patient received an epidural/narcotic injection for pain relief.  The patient achieved complete dilation at 1957. She went on to deliver a  male infant at 2344 by . Apgars were  8 at one minute and 9 at five minutes. The fetal oropharynx was bulb suctioned on the perineum.  There was one nuchal cord that was delivered through. The placenta delivered spontaneously and intact at 2347.  The patient had a second degree. This was repaired with #3-0.  EBL for the delivery was 367 cc.           Hospital Course:   The patient's hospital course was unremarkable.  On discharge, her pain was well controlled. Vaginal bleeding is similar to peak menstrual flow.  Voiding without difficulty.  Ambulating well and tolerating a normal diet.  No fever.  Breastfeeding well.  Infant is stable. She was discharged on post-partum day #2.    Post-partum hemoglobin:   Hemoglobin   Date Value Ref Range Status   2022 13.3 11.7 - 15.7 g/dL Final             Discharge Instructions and Follow-Up:     Discharge diet: Regular    Discharge activity: Pelvic rest for 6 weeks including no sexual intercourse, tampons, or douching.   Discharge follow-up: Follow up with your primary OB for a routine postpartum visit in 6 weeks           Discharge Disposition:     Discharged to home      Meredith Chan, MD [facility]  8/10/2022 7:53 AM

## 2022-08-10 NOTE — PROGRESS NOTES
Patient Name:  Corinne Mclain   MRN:  [id number]  Age:  31 year old    YOB: 1990      POSTPARTUM PROGRESS NOTE    Pt is PPD#2 s/p vaginal delivery.  She is doing well without complaints.  Pt is ambulating, voiding, tolerating a regular diet.  Pain is well controlled and lochia is within normal limits.  She is breastfeeding.  Baby is doing well.    Objective:    Temp:  [97.9  F (36.6  C)-98.5  F (36.9  C)] 97.9  F (36.6  C)  Resp:  [16-18] 16  BP: (112-119)/(76-84) 112/76  0 lbs 0 oz    General Appearance:  NAD  Lungs:  unlabored  Cardiovascular:  RRR  Abdomen:  nontender, nondistended  Fundus:  firm, below the umbilicus      Lower extremities:  trace symmetric edema    Lab Review:    ABO/RH(D)   Date Value Ref Range Status   2022 A POS  Final     Hemoglobin   Date Value Ref Range Status   2022 13.3 11.7 - 15.7 g/dL Final   01/15/2022 14.6 11.7 - 15.7 g/dL Final     Hematocrit   Date Value Ref Range Status   01/15/2022 42.4 35.0 - 47.0 % Final       Lab Results   Component Value Date    WBC 10.5 01/15/2022     Lab Results   Component Value Date    RBC 4.89 01/15/2022     Lab Results   Component Value Date    HGB 13.3 2022     Lab Results   Component Value Date    HCT 42.4 01/15/2022     No components found for: MCT  Lab Results   Component Value Date    MCV 87 01/15/2022     Lab Results   Component Value Date    MCH 29.9 01/15/2022     Lab Results   Component Value Date    MCHC 34.4 01/15/2022     Lab Results   Component Value Date    RDW 11.9 01/15/2022     Lab Results   Component Value Date     01/15/2022       Assessment:  32yo  PPD#2 s/p vaginal delivery, doing well.    Plan:   - Postpartum: recovering well. Pain well controlled. Cont PO pain meds and regular diet. Encourage ambulation.  - Contraception: Mirena IUD  - Dispo: anticipate DC today      Meredith Chan, MD [facility]  August 10, 2022

## 2022-08-10 NOTE — PROGRESS NOTES
Public Health Nurse (PHN) spoke with patient regarding [facility] services and resources.  Patient was provided the [facility] Community Resource Guide and Public Health rack cards. Patient is aware of how add baby to insurance and has a primary clinic arranged for baby.  Patient accepted referral for home visiting services.  Codyen Warning given, referral completed electronically.  Patient reports no questions or concerns at this time.

## 2022-08-10 NOTE — PLAN OF CARE
Pt stable for discharge to home.  VS and assessment WNL.  Discharge education and follow up reviewed with patient.  Questions/concerns addressed.  ID bands matched with infant.  Pt ambulated off unit with family at time of discharge, escorted by unit staff.  Discharged to home.

## 2022-09-25 ENCOUNTER — HEALTH MAINTENANCE LETTER (OUTPATIENT)
Age: 32
End: 2022-09-25

## 2023-02-04 ENCOUNTER — HEALTH MAINTENANCE LETTER (OUTPATIENT)
Age: 33
End: 2023-02-04

## 2024-03-02 ENCOUNTER — HEALTH MAINTENANCE LETTER (OUTPATIENT)
Age: 34
End: 2024-03-02

## 2025-03-15 ENCOUNTER — HEALTH MAINTENANCE LETTER (OUTPATIENT)
Age: 35
End: 2025-03-15

## 2025-03-18 ENCOUNTER — HOSPITAL ENCOUNTER (OUTPATIENT)
Facility: CLINIC | Age: 35
Discharge: HOME | End: 2025-03-18
Attending: OBSTETRICS & GYNECOLOGY | Admitting: OBSTETRICS & GYNECOLOGY
Payer: COMMERCIAL

## 2025-03-18 VITALS
SYSTOLIC BLOOD PRESSURE: 104 MMHG | RESPIRATION RATE: 18 BRPM | HEIGHT: 62 IN | BODY MASS INDEX: 34.6 KG/M2 | TEMPERATURE: 98.2 F | WEIGHT: 188 LBS | DIASTOLIC BLOOD PRESSURE: 63 MMHG | HEART RATE: 95 BPM

## 2025-03-18 PROBLEM — Z36.89 ENCOUNTER FOR TRIAGE IN PREGNANT PATIENT: Status: ACTIVE | Noted: 2025-03-18

## 2025-03-18 LAB
ALBUMIN MFR UR ELPH: <6 MG/DL
ALBUMIN SERPL BCG-MCNC: 3.5 G/DL (ref 3.5–5.2)
ALP SERPL-CCNC: 150 U/L (ref 40–150)
ALT SERPL W P-5'-P-CCNC: 11 U/L (ref 0–50)
ANION GAP SERPL CALCULATED.3IONS-SCNC: 13 MMOL/L (ref 7–15)
AST SERPL W P-5'-P-CCNC: 19 U/L (ref 0–45)
BILIRUB SERPL-MCNC: 0.4 MG/DL
BUN SERPL-MCNC: 4.9 MG/DL (ref 6–20)
CALCIUM SERPL-MCNC: 9 MG/DL (ref 8.8–10.4)
CHLORIDE SERPL-SCNC: 100 MMOL/L (ref 98–107)
CREAT SERPL-MCNC: 0.49 MG/DL (ref 0.51–0.95)
CREAT UR-MCNC: 21.3 MG/DL
EGFRCR SERPLBLD CKD-EPI 2021: >90 ML/MIN/1.73M2
ERYTHROCYTE [DISTWIDTH] IN BLOOD BY AUTOMATED COUNT: 12.2 % (ref 10–15)
GLUCOSE SERPL-MCNC: 112 MG/DL (ref 70–99)
HCO3 SERPL-SCNC: 21 MMOL/L (ref 22–29)
HCT VFR BLD AUTO: 38.9 % (ref 35–47)
HGB BLD-MCNC: 13.7 G/DL (ref 11.7–15.7)
MCH RBC QN AUTO: 29.8 PG (ref 26.5–33)
MCHC RBC AUTO-ENTMCNC: 35.2 G/DL (ref 31.5–36.5)
MCV RBC AUTO: 85 FL (ref 78–100)
PLATELET # BLD AUTO: 172 10E3/UL (ref 150–450)
POTASSIUM SERPL-SCNC: 3.8 MMOL/L (ref 3.4–5.3)
PROT SERPL-MCNC: 6.5 G/DL (ref 6.4–8.3)
PROT/CREAT 24H UR: NORMAL MG/G{CREAT}
RBC # BLD AUTO: 4.59 10E6/UL (ref 3.8–5.2)
SODIUM SERPL-SCNC: 134 MMOL/L (ref 135–145)
WBC # BLD AUTO: 10 10E3/UL (ref 4–11)

## 2025-03-18 PROCEDURE — 82310 ASSAY OF CALCIUM: CPT | Performed by: OBSTETRICS & GYNECOLOGY

## 2025-03-18 PROCEDURE — G0463 HOSPITAL OUTPT CLINIC VISIT: HCPCS | Mod: 25

## 2025-03-18 PROCEDURE — 85014 HEMATOCRIT: CPT | Performed by: OBSTETRICS & GYNECOLOGY

## 2025-03-18 PROCEDURE — 36415 COLL VENOUS BLD VENIPUNCTURE: CPT | Performed by: OBSTETRICS & GYNECOLOGY

## 2025-03-18 PROCEDURE — 82040 ASSAY OF SERUM ALBUMIN: CPT | Performed by: OBSTETRICS & GYNECOLOGY

## 2025-03-18 PROCEDURE — 59025 FETAL NON-STRESS TEST: CPT

## 2025-03-18 PROCEDURE — 80048 BASIC METABOLIC PNL TOTAL CA: CPT | Performed by: OBSTETRICS & GYNECOLOGY

## 2025-03-18 PROCEDURE — 82247 BILIRUBIN TOTAL: CPT | Performed by: OBSTETRICS & GYNECOLOGY

## 2025-03-18 PROCEDURE — 84156 ASSAY OF PROTEIN URINE: CPT | Performed by: OBSTETRICS & GYNECOLOGY

## 2025-03-18 ASSESSMENT — ACTIVITIES OF DAILY LIVING (ADL)
ADLS_ACUITY_SCORE: 23
ADLS_ACUITY_SCORE: 23

## 2025-03-18 NOTE — PLAN OF CARE
"Data: Patient presented to [facility]: 3/18/2025  9:12 AM.  Reason for maternal/fetal assessment is elevated blood pressures. Patient reports she had some elevated BPs at home this morning. BPs per patient 124/94 with repeats one hour later 127/102, 127/92, and 122/83. Patient reports taking multiple repeat BPs because she was nervous that they \"were so high.\" Patient reports with last pregnancy BP trended upwards as well, but no report of preeclampsia or GHTN.  Patient is a .  Prenatal record reviewed. Pregnancy has been uncomplicated..  Gestational Age 38w2d. VSS. Fetal movement active. Patient denies uterine contractions, leaking of vaginal fluid/rupture of membranes, vaginal bleeding, abdominal pain, pelvic pressure, nausea, vomiting, headache, visual disturbances, epigastric or URQ pain, significant edema. Support person is not present.   Action: Verbal consent for EFM. Triage assessment completed. Bill of rights reviewed. Dr. Bender placed orders for CBC, CMP, protein random urine, serial BPs and NST.  Response: Patient verbalized agreement with plan. Will contact Dr Martha Salcedo with update and for further orders.    "

## 2025-03-18 NOTE — PROVIDER NOTIFICATION
03/18/25 1048   Provider Notification   Provider Name/Title Dr. Bender   Method of Notification In Department   Request Evaluate - Remote   Notification Reason Lab/Diagnostic Study;Maternal Vital Sign Change;Status Update     Dr. Bender at nurse station and updated on FHR tracing category I with baseline 145bpm, accelerations present, rare contractions that patient is not feeling and are very mild to palpation. BPs upon arrival normotensive (see flowsheet), labs WNL. Patient denies headache, visual disturbances, and epigastric pain, normal reflexes and no clonus bilaterally upon exam. Orders received to discharge patient to home with follow up as scheduled in clinic on Thursday. Patient to hold on BP checks at home until able to verify cuff in clinic.

## 2025-03-18 NOTE — PLAN OF CARE
Data: Patient assessed in the [facility] for elevated blood pressures.  Cervical exam not examined.  Membranes intact.  Contractions/uterine assessment rare per toco, very mild to palpation and patient reports not feeling.  Action:  Presumed adequate fetal oxygenation documented (see flow record). Discharge instructions reviewed.  Patient instructed to report change in fetal movement, vaginal leaking of fluid or bleeding, abdominal pain, or any concerns related to the pregnancy to her nurse/physician.    Response: Orders to discharge home per Martha Salcedo.  Patient verbalized understanding of education and verbalized agreement with plan. Discharged to home at 1114.

## 2025-03-18 NOTE — DISCHARGE INSTRUCTIONS
Learning About When to Call Your Doctor During Pregnancy (After 20 Weeks)  Overview  It's common to have concerns about what might be a problem when you're pregnant. Most pregnancies don't have any serious problems. But it's still important to know when to call your doctor if you have certain symptoms or signs of labor.  These are general suggestions. Your doctor may give you some more information about when to call.  When to call your doctor (after 20 weeks)  Call 911  anytime you think you may need emergency care. For example, call if:  You have severe vaginal bleeding. This means you are soaking through a pad each hour for 2 or more hours.  You have sudden, severe pain in your belly.  You have chest pain, are short of breath, or cough up blood.  You passed out (lost consciousness).  You have a seizure.  You see or feel the umbilical cord.  You think you are about to deliver your baby and can't make it safely to the hospital or birthing center.  Call your doctor now or seek immediate medical care if:  You have vaginal bleeding.  You have belly pain.  You have a fever.  You are dizzy or lightheaded, or you feel like you may faint.  You have signs of a blood clot in your leg (called a deep vein thrombosis), such as:  Pain in the calf, back of the knee, thigh, or groin.  Swelling in your leg or groin.  A color change on the leg or groin. The skin may be reddish or purplish, depending on your usual skin color.  You have symptoms of preeclampsia, such as:  Sudden swelling of your face, hands, or feet.  New vision problems (such as dimness, blurring, or seeing spots).  A severe headache.  You have a sudden release of fluid from your vagina. (You think your water broke.)  You've been having regular contractions for an hour. This means that you've had at least 6 contractions within 1 hour, even after you change your position and drink fluids.  You notice that your baby has stopped moving or is moving less than  "normal.  You have signs of heart failure, such as:  New or increased shortness of breath.  New or worse swelling in your legs, ankles, or feet.  Sudden weight gain, such as more than 2 to 3 pounds in a day or 5 pounds in a week.  Feeling so tired or weak that you cannot do your usual activities.  You have symptoms of a urinary tract infection. These may include:  Pain or burning when you urinate.  A frequent need to urinate without being able to pass much urine.  Pain in the flank, which is just below the rib cage and above the waist on either side of the back.  Blood in your urine.  Watch closely for changes in your health, and be sure to contact your doctor if:  You have vaginal discharge that smells bad.  You feel sad, anxious, or hopeless for more than a few days.  You have skin changes, such as a rash, itching, or a yellow color to your skin.  You have other concerns about your pregnancy.  If you have labor signs at 37 weeks or more  If you have signs of labor at 37 weeks or more, your doctor may tell you to call when your labor becomes more active. Symptoms of active labor include:  Contractions that are regular.  Contractions that are less than 5 minutes apart.  Contractions that are hard to talk through.  Follow-up care is a key part of your treatment and safety. Be sure to make and go to all appointments, and call your doctor if you are having problems. It's also a good idea to know your test results and keep a list of the medicines you take.  Where can you learn more?  Go to https://www.[employer].net/patiented  Enter N531 in the search box to learn more about \"Learning About When to Call Your Doctor During Pregnancy (After 20 Weeks).\"  Current as of: April 30, 2024  Content Version: 14.4    2302-1037 [facility] [employer].   Care instructions adapted under license by your healthcare professional. If you have questions about a medical condition or this instruction, always ask your healthcare professional. " [employer], [facility] disclaims any warranty or liability for your use of this information.

## 2025-03-26 ENCOUNTER — HOSPITAL ENCOUNTER (INPATIENT)
Facility: CLINIC | Age: 35
End: 2025-03-26
Attending: OBSTETRICS & GYNECOLOGY | Admitting: OBSTETRICS & GYNECOLOGY
Payer: COMMERCIAL

## 2025-03-26 ENCOUNTER — ANESTHESIA EVENT (OUTPATIENT)
Dept: OBGYN | Facility: CLINIC | Age: 35
End: 2025-03-26
Payer: COMMERCIAL

## 2025-03-26 ENCOUNTER — ANESTHESIA (OUTPATIENT)
Dept: OBGYN | Facility: CLINIC | Age: 35
End: 2025-03-26
Payer: COMMERCIAL

## 2025-03-26 DIAGNOSIS — O13.9 GESTATIONAL HYPERTENSION, ANTEPARTUM: ICD-10-CM

## 2025-03-26 LAB
ABO + RH BLD: NORMAL
ALBUMIN MFR UR ELPH: 7.3 MG/DL
ALBUMIN SERPL BCG-MCNC: 3.7 G/DL (ref 3.5–5.2)
ALP SERPL-CCNC: 155 U/L (ref 40–150)
ALT SERPL W P-5'-P-CCNC: 11 U/L (ref 0–50)
ANION GAP SERPL CALCULATED.3IONS-SCNC: 12 MMOL/L (ref 7–15)
AST SERPL W P-5'-P-CCNC: 19 U/L (ref 0–45)
BILIRUB SERPL-MCNC: 0.3 MG/DL
BLD GP AB SCN SERPL QL: NEGATIVE
BUN SERPL-MCNC: 5.6 MG/DL (ref 6–20)
CALCIUM SERPL-MCNC: 9.1 MG/DL (ref 8.8–10.4)
CHLORIDE SERPL-SCNC: 101 MMOL/L (ref 98–107)
CREAT SERPL-MCNC: 0.51 MG/DL (ref 0.51–0.95)
CREAT UR-MCNC: 66.5 MG/DL
EGFRCR SERPLBLD CKD-EPI 2021: >90 ML/MIN/1.73M2
ERYTHROCYTE [DISTWIDTH] IN BLOOD BY AUTOMATED COUNT: 12.2 % (ref 10–15)
GLUCOSE SERPL-MCNC: 104 MG/DL (ref 70–99)
HCO3 SERPL-SCNC: 20 MMOL/L (ref 22–29)
HCT VFR BLD AUTO: 38.4 % (ref 35–47)
HGB BLD-MCNC: 13.1 G/DL (ref 11.7–15.7)
MCH RBC QN AUTO: 29.2 PG (ref 26.5–33)
MCHC RBC AUTO-ENTMCNC: 34.1 G/DL (ref 31.5–36.5)
MCV RBC AUTO: 86 FL (ref 78–100)
PLATELET # BLD AUTO: 170 10E3/UL (ref 150–450)
POTASSIUM SERPL-SCNC: 4 MMOL/L (ref 3.4–5.3)
PROT SERPL-MCNC: 6.4 G/DL (ref 6.4–8.3)
PROT/CREAT 24H UR: 0.11 MG/MG CR (ref 0–0.2)
RBC # BLD AUTO: 4.49 10E6/UL (ref 3.8–5.2)
SODIUM SERPL-SCNC: 133 MMOL/L (ref 135–145)
SPECIMEN EXP DATE BLD: NORMAL
T PALLIDUM AB SER QL: NONREACTIVE
WBC # BLD AUTO: 9.2 10E3/UL (ref 4–11)

## 2025-03-26 PROCEDURE — 85018 HEMOGLOBIN: CPT | Performed by: OBSTETRICS & GYNECOLOGY

## 2025-03-26 PROCEDURE — 250N000011 HC RX IP 250 OP 636: Performed by: OBSTETRICS & GYNECOLOGY

## 2025-03-26 PROCEDURE — 120N000001 HC R&B MED SURG/OB

## 2025-03-26 PROCEDURE — 370N000003 HC ANESTHESIA WARD SERVICE: Performed by: ANESTHESIOLOGY

## 2025-03-26 PROCEDURE — 86900 BLOOD TYPING SEROLOGIC ABO: CPT | Performed by: OBSTETRICS & GYNECOLOGY

## 2025-03-26 PROCEDURE — 84132 ASSAY OF SERUM POTASSIUM: CPT | Performed by: OBSTETRICS & GYNECOLOGY

## 2025-03-26 PROCEDURE — 00HU33Z INSERTION OF INFUSION DEVICE INTO SPINAL CANAL, PERCUTANEOUS APPROACH: ICD-10-PCS | Performed by: ANESTHESIOLOGY

## 2025-03-26 PROCEDURE — 722N000001 HC LABOR CARE VAGINAL DELIVERY SINGLE

## 2025-03-26 PROCEDURE — 250N000009 HC RX 250: Performed by: OBSTETRICS & GYNECOLOGY

## 2025-03-26 PROCEDURE — 258N000003 HC RX IP 258 OP 636: Performed by: OBSTETRICS & GYNECOLOGY

## 2025-03-26 PROCEDURE — 0KQM0ZZ REPAIR PERINEUM MUSCLE, OPEN APPROACH: ICD-10-PCS | Performed by: OBSTETRICS & GYNECOLOGY

## 2025-03-26 PROCEDURE — 250N000011 HC RX IP 250 OP 636: Mod: JZ | Performed by: ANESTHESIOLOGY

## 2025-03-26 PROCEDURE — 86780 TREPONEMA PALLIDUM: CPT | Performed by: OBSTETRICS & GYNECOLOGY

## 2025-03-26 PROCEDURE — 84155 ASSAY OF PROTEIN SERUM: CPT | Performed by: OBSTETRICS & GYNECOLOGY

## 2025-03-26 PROCEDURE — 250N000013 HC RX MED GY IP 250 OP 250 PS 637: Performed by: OBSTETRICS & GYNECOLOGY

## 2025-03-26 PROCEDURE — 999N000016 HC STATISTIC ATTENDANCE AT DELIVERY

## 2025-03-26 PROCEDURE — G0463 HOSPITAL OUTPT CLINIC VISIT: HCPCS

## 2025-03-26 PROCEDURE — 3E0R3BZ INTRODUCTION OF ANESTHETIC AGENT INTO SPINAL CANAL, PERCUTANEOUS APPROACH: ICD-10-PCS | Performed by: ANESTHESIOLOGY

## 2025-03-26 PROCEDURE — 84156 ASSAY OF PROTEIN URINE: CPT | Performed by: OBSTETRICS & GYNECOLOGY

## 2025-03-26 PROCEDURE — 86850 RBC ANTIBODY SCREEN: CPT | Performed by: OBSTETRICS & GYNECOLOGY

## 2025-03-26 RX ORDER — FENTANYL CITRATE-0.9 % NACL/PF 10 MCG/ML
100 PLASTIC BAG, INJECTION (ML) INTRAVENOUS EVERY 5 MIN PRN
Status: DISCONTINUED | OUTPATIENT
Start: 2025-03-26 | End: 2025-03-26 | Stop reason: HOSPADM

## 2025-03-26 RX ORDER — POLYETHYLENE GLYCOL 3350 17 G/17G
17 POWDER, FOR SOLUTION ORAL DAILY PRN
Status: DISCONTINUED | OUTPATIENT
Start: 2025-03-26 | End: 2025-03-29 | Stop reason: HOSPADM

## 2025-03-26 RX ORDER — NALBUPHINE HYDROCHLORIDE 10 MG/ML
2.5-5 INJECTION INTRAMUSCULAR; INTRAVENOUS; SUBCUTANEOUS EVERY 6 HOURS PRN
Status: DISCONTINUED | OUTPATIENT
Start: 2025-03-26 | End: 2025-03-26

## 2025-03-26 RX ORDER — BISACODYL 10 MG
10 SUPPOSITORY, RECTAL RECTAL DAILY PRN
Status: DISCONTINUED | OUTPATIENT
Start: 2025-03-26 | End: 2025-03-29 | Stop reason: HOSPADM

## 2025-03-26 RX ORDER — CARBOPROST TROMETHAMINE 250 UG/ML
250 INJECTION, SOLUTION INTRAMUSCULAR
Status: DISCONTINUED | OUTPATIENT
Start: 2025-03-26 | End: 2025-03-26 | Stop reason: HOSPADM

## 2025-03-26 RX ORDER — OXYTOCIN/0.9 % SODIUM CHLORIDE 30/500 ML
1-24 PLASTIC BAG, INJECTION (ML) INTRAVENOUS CONTINUOUS
Status: DISCONTINUED | OUTPATIENT
Start: 2025-03-26 | End: 2025-03-26 | Stop reason: HOSPADM

## 2025-03-26 RX ORDER — ONDANSETRON 4 MG/1
4 TABLET, ORALLY DISINTEGRATING ORAL EVERY 6 HOURS PRN
Status: DISCONTINUED | OUTPATIENT
Start: 2025-03-26 | End: 2025-03-26 | Stop reason: HOSPADM

## 2025-03-26 RX ORDER — METOCLOPRAMIDE 10 MG/1
10 TABLET ORAL EVERY 6 HOURS PRN
Status: DISCONTINUED | OUTPATIENT
Start: 2025-03-26 | End: 2025-03-26 | Stop reason: HOSPADM

## 2025-03-26 RX ORDER — OXYTOCIN/0.9 % SODIUM CHLORIDE 30/500 ML
100-340 PLASTIC BAG, INJECTION (ML) INTRAVENOUS CONTINUOUS PRN
Status: DISCONTINUED | OUTPATIENT
Start: 2025-03-26 | End: 2025-03-26

## 2025-03-26 RX ORDER — MISOPROSTOL 200 UG/1
400 TABLET ORAL
Status: DISCONTINUED | OUTPATIENT
Start: 2025-03-26 | End: 2025-03-29 | Stop reason: HOSPADM

## 2025-03-26 RX ORDER — BUPIVACAINE HYDROCHLORIDE 2.5 MG/ML
INJECTION, SOLUTION EPIDURAL; INFILTRATION; INTRACAUDAL; PERINEURAL
Status: COMPLETED | OUTPATIENT
Start: 2025-03-26 | End: 2025-03-26

## 2025-03-26 RX ORDER — SODIUM CHLORIDE, SODIUM LACTATE, POTASSIUM CHLORIDE, CALCIUM CHLORIDE 600; 310; 30; 20 MG/100ML; MG/100ML; MG/100ML; MG/100ML
INJECTION, SOLUTION INTRAVENOUS CONTINUOUS
Status: DISCONTINUED | OUTPATIENT
Start: 2025-03-26 | End: 2025-03-26 | Stop reason: HOSPADM

## 2025-03-26 RX ORDER — TRANEXAMIC ACID 10 MG/ML
1 INJECTION, SOLUTION INTRAVENOUS EVERY 30 MIN PRN
Status: DISCONTINUED | OUTPATIENT
Start: 2025-03-26 | End: 2025-03-26 | Stop reason: HOSPADM

## 2025-03-26 RX ORDER — LOPERAMIDE HYDROCHLORIDE 2 MG/1
4 CAPSULE ORAL
Status: DISCONTINUED | OUTPATIENT
Start: 2025-03-26 | End: 2025-03-26 | Stop reason: HOSPADM

## 2025-03-26 RX ORDER — MISOPROSTOL 200 UG/1
800 TABLET ORAL
Status: DISCONTINUED | OUTPATIENT
Start: 2025-03-26 | End: 2025-03-29 | Stop reason: HOSPADM

## 2025-03-26 RX ORDER — IBUPROFEN 800 MG/1
800 TABLET, FILM COATED ORAL EVERY 6 HOURS PRN
Status: DISCONTINUED | OUTPATIENT
Start: 2025-03-26 | End: 2025-03-29 | Stop reason: HOSPADM

## 2025-03-26 RX ORDER — CITRIC ACID/SODIUM CITRATE 334-500MG
30 SOLUTION, ORAL ORAL
Status: DISCONTINUED | OUTPATIENT
Start: 2025-03-26 | End: 2025-03-26 | Stop reason: HOSPADM

## 2025-03-26 RX ORDER — HYDROCORTISONE 25 MG/G
CREAM TOPICAL 3 TIMES DAILY PRN
Status: DISCONTINUED | OUTPATIENT
Start: 2025-03-26 | End: 2025-03-29 | Stop reason: HOSPADM

## 2025-03-26 RX ORDER — CARBOPROST TROMETHAMINE 250 UG/ML
250 INJECTION, SOLUTION INTRAMUSCULAR
Status: DISCONTINUED | OUTPATIENT
Start: 2025-03-26 | End: 2025-03-29 | Stop reason: HOSPADM

## 2025-03-26 RX ORDER — NALOXONE HYDROCHLORIDE 0.4 MG/ML
0.4 INJECTION, SOLUTION INTRAMUSCULAR; INTRAVENOUS; SUBCUTANEOUS
Status: DISCONTINUED | OUTPATIENT
Start: 2025-03-26 | End: 2025-03-26

## 2025-03-26 RX ORDER — ONDANSETRON 2 MG/ML
4 INJECTION INTRAMUSCULAR; INTRAVENOUS EVERY 6 HOURS PRN
Status: DISCONTINUED | OUTPATIENT
Start: 2025-03-26 | End: 2025-03-26 | Stop reason: HOSPADM

## 2025-03-26 RX ORDER — DOCUSATE SODIUM 100 MG/1
100 CAPSULE, LIQUID FILLED ORAL DAILY
Qty: 30 CAPSULE | Refills: 0 | Status: SHIPPED | OUTPATIENT
Start: 2025-03-26

## 2025-03-26 RX ORDER — FENTANYL CITRATE 50 UG/ML
100 INJECTION, SOLUTION INTRAMUSCULAR; INTRAVENOUS
Status: DISCONTINUED | OUTPATIENT
Start: 2025-03-26 | End: 2025-03-26 | Stop reason: HOSPADM

## 2025-03-26 RX ORDER — AMOXICILLIN 250 MG
2 CAPSULE ORAL
Status: DISCONTINUED | OUTPATIENT
Start: 2025-03-26 | End: 2025-03-29 | Stop reason: HOSPADM

## 2025-03-26 RX ORDER — LOPERAMIDE HYDROCHLORIDE 2 MG/1
2 CAPSULE ORAL
Status: DISCONTINUED | OUTPATIENT
Start: 2025-03-26 | End: 2025-03-26 | Stop reason: HOSPADM

## 2025-03-26 RX ORDER — OXYTOCIN/0.9 % SODIUM CHLORIDE 30/500 ML
340 PLASTIC BAG, INJECTION (ML) INTRAVENOUS CONTINUOUS PRN
Status: DISCONTINUED | OUTPATIENT
Start: 2025-03-26 | End: 2025-03-26 | Stop reason: HOSPADM

## 2025-03-26 RX ORDER — ACETAMINOPHEN 325 MG/1
650 TABLET ORAL EVERY 4 HOURS PRN
Status: DISCONTINUED | OUTPATIENT
Start: 2025-03-26 | End: 2025-03-26 | Stop reason: HOSPADM

## 2025-03-26 RX ORDER — NALOXONE HYDROCHLORIDE 0.4 MG/ML
0.2 INJECTION, SOLUTION INTRAMUSCULAR; INTRAVENOUS; SUBCUTANEOUS
Status: DISCONTINUED | OUTPATIENT
Start: 2025-03-26 | End: 2025-03-26

## 2025-03-26 RX ORDER — LOPERAMIDE HYDROCHLORIDE 2 MG/1
4 CAPSULE ORAL
Status: DISCONTINUED | OUTPATIENT
Start: 2025-03-26 | End: 2025-03-29 | Stop reason: HOSPADM

## 2025-03-26 RX ORDER — PROCHLORPERAZINE MALEATE 10 MG
10 TABLET ORAL EVERY 6 HOURS PRN
Status: DISCONTINUED | OUTPATIENT
Start: 2025-03-26 | End: 2025-03-26 | Stop reason: HOSPADM

## 2025-03-26 RX ORDER — KETOROLAC TROMETHAMINE 15 MG/ML
15 INJECTION, SOLUTION INTRAMUSCULAR; INTRAVENOUS
Status: COMPLETED | OUTPATIENT
Start: 2025-03-26 | End: 2025-03-26

## 2025-03-26 RX ORDER — LOPERAMIDE HYDROCHLORIDE 2 MG/1
2 CAPSULE ORAL
Status: DISCONTINUED | OUTPATIENT
Start: 2025-03-26 | End: 2025-03-29 | Stop reason: HOSPADM

## 2025-03-26 RX ORDER — OXYTOCIN/0.9 % SODIUM CHLORIDE 30/500 ML
340 PLASTIC BAG, INJECTION (ML) INTRAVENOUS CONTINUOUS PRN
Status: DISCONTINUED | OUTPATIENT
Start: 2025-03-26 | End: 2025-03-29 | Stop reason: HOSPADM

## 2025-03-26 RX ORDER — OXYTOCIN 10 [USP'U]/ML
10 INJECTION, SOLUTION INTRAMUSCULAR; INTRAVENOUS
Status: DISCONTINUED | OUTPATIENT
Start: 2025-03-26 | End: 2025-03-29 | Stop reason: HOSPADM

## 2025-03-26 RX ORDER — ACETAMINOPHEN 325 MG/1
650 TABLET ORAL EVERY 4 HOURS PRN
Qty: 40 TABLET | Refills: 1 | Status: SHIPPED | OUTPATIENT
Start: 2025-03-26

## 2025-03-26 RX ORDER — TRANEXAMIC ACID 10 MG/ML
1 INJECTION, SOLUTION INTRAVENOUS EVERY 30 MIN PRN
Status: DISCONTINUED | OUTPATIENT
Start: 2025-03-26 | End: 2025-03-29 | Stop reason: HOSPADM

## 2025-03-26 RX ORDER — OXYTOCIN 10 [USP'U]/ML
10 INJECTION, SOLUTION INTRAMUSCULAR; INTRAVENOUS
Status: DISCONTINUED | OUTPATIENT
Start: 2025-03-26 | End: 2025-03-26

## 2025-03-26 RX ORDER — METHYLERGONOVINE MALEATE 0.2 MG/ML
200 INJECTION INTRAVENOUS
Status: DISCONTINUED | OUTPATIENT
Start: 2025-03-26 | End: 2025-03-29 | Stop reason: HOSPADM

## 2025-03-26 RX ORDER — IBUPROFEN 800 MG/1
800 TABLET, FILM COATED ORAL
Status: COMPLETED | OUTPATIENT
Start: 2025-03-26 | End: 2025-03-26

## 2025-03-26 RX ORDER — IBUPROFEN 800 MG/1
800 TABLET, FILM COATED ORAL EVERY 6 HOURS PRN
Qty: 60 TABLET | Refills: 0 | Status: SHIPPED | OUTPATIENT
Start: 2025-03-26

## 2025-03-26 RX ORDER — METHYLERGONOVINE MALEATE 0.2 MG/ML
200 INJECTION INTRAVENOUS
Status: DISCONTINUED | OUTPATIENT
Start: 2025-03-26 | End: 2025-03-26 | Stop reason: HOSPADM

## 2025-03-26 RX ORDER — OXYTOCIN 10 [USP'U]/ML
10 INJECTION, SOLUTION INTRAMUSCULAR; INTRAVENOUS
Status: DISCONTINUED | OUTPATIENT
Start: 2025-03-26 | End: 2025-03-26 | Stop reason: HOSPADM

## 2025-03-26 RX ORDER — FENTANYL/BUPIVACAINE/NS/PF 2-1250MCG
PLASTIC BAG, INJECTION (ML) INJECTION
Status: COMPLETED
Start: 2025-03-26 | End: 2025-03-26

## 2025-03-26 RX ORDER — ACETAMINOPHEN 325 MG/1
650 TABLET ORAL EVERY 4 HOURS PRN
Status: DISCONTINUED | OUTPATIENT
Start: 2025-03-26 | End: 2025-03-29 | Stop reason: HOSPADM

## 2025-03-26 RX ORDER — OXYCODONE HYDROCHLORIDE 5 MG/1
5 TABLET ORAL EVERY 4 HOURS PRN
Status: DISCONTINUED | OUTPATIENT
Start: 2025-03-26 | End: 2025-03-29 | Stop reason: HOSPADM

## 2025-03-26 RX ORDER — MISOPROSTOL 200 UG/1
400 TABLET ORAL
Status: DISCONTINUED | OUTPATIENT
Start: 2025-03-26 | End: 2025-03-26 | Stop reason: HOSPADM

## 2025-03-26 RX ORDER — MISOPROSTOL 200 UG/1
800 TABLET ORAL
Status: DISCONTINUED | OUTPATIENT
Start: 2025-03-26 | End: 2025-03-26 | Stop reason: HOSPADM

## 2025-03-26 RX ORDER — METOCLOPRAMIDE HYDROCHLORIDE 5 MG/ML
10 INJECTION INTRAMUSCULAR; INTRAVENOUS EVERY 6 HOURS PRN
Status: DISCONTINUED | OUTPATIENT
Start: 2025-03-26 | End: 2025-03-26 | Stop reason: HOSPADM

## 2025-03-26 RX ADMIN — FENTANYL CITRATE: 0.05 INJECTION, SOLUTION INTRAMUSCULAR; INTRAVENOUS at 09:56

## 2025-03-26 RX ADMIN — Medication: at 09:56

## 2025-03-26 RX ADMIN — ACETAMINOPHEN 650 MG: 325 TABLET, FILM COATED ORAL at 17:02

## 2025-03-26 RX ADMIN — ACETAMINOPHEN 650 MG: 325 TABLET, FILM COATED ORAL at 21:35

## 2025-03-26 RX ADMIN — KETOROLAC TROMETHAMINE 15 MG: 15 INJECTION, SOLUTION INTRAMUSCULAR; INTRAVENOUS at 11:15

## 2025-03-26 RX ADMIN — Medication 340 ML/HR: at 10:53

## 2025-03-26 RX ADMIN — IBUPROFEN 800 MG: 800 TABLET, FILM COATED ORAL at 19:08

## 2025-03-26 RX ADMIN — BUPIVACAINE HYDROCHLORIDE 10 ML: 2.5 INJECTION, SOLUTION EPIDURAL; INFILTRATION; INTRACAUDAL at 08:38

## 2025-03-26 RX ADMIN — SODIUM CHLORIDE, SODIUM LACTATE, POTASSIUM CHLORIDE, AND CALCIUM CHLORIDE 500 ML: .6; .31; .03; .02 INJECTION, SOLUTION INTRAVENOUS at 08:10

## 2025-03-26 ASSESSMENT — ACTIVITIES OF DAILY LIVING (ADL)
ADLS_ACUITY_SCORE: 23
ADLS_ACUITY_SCORE: 24
ADLS_ACUITY_SCORE: 23
ADLS_ACUITY_SCORE: 24
ADLS_ACUITY_SCORE: 24
ADLS_ACUITY_SCORE: 23
ADLS_ACUITY_SCORE: 24
ADLS_ACUITY_SCORE: 23

## 2025-03-26 NOTE — PROVIDER NOTIFICATION
25 0341   Provider Notification   Provider Name/Title Dr. Bender   Method of Notification Electronic Page   Request Evaluate - Remote   Notification Reason Patient Arrived      at 39&3 weeks, here for SROM. uncomplicated pregnancy other than a few mild range BP's but no GHTN diagnosis; /85. SROM'ed at 0200, clear fluid, positive for pooling. SVE 3.5/60/-3. brando occasionally & FHR overall moderate with accels. Orders received for intrapartum order set and baseline pre-e labs.

## 2025-03-26 NOTE — ANESTHESIA PROCEDURE NOTES
"Epidural catheter Procedure Note    Pre-Procedure   Staff -        Anesthesiologist:  Loco Paula MD       Performed By: anesthesiologist       Location: OB       Procedure Start/Stop Times: 3/26/2025 8:25 AM and 3/26/2025 8:44 AM       Pre-Anesthestic Checklist: patient identified, IV checked, risks and benefits discussed, informed consent, monitors and equipment checked, pre-op evaluation and at physician/surgeon's request  Timeout:       Correct Patient: Yes        Correct Procedure: Yes        Correct Site: Yes        Correct Position: Yes   Procedure Documentation  Procedure: epidural catheter         Patient Position: sitting       Patient Prep/Sterile Barriers: sterile gloves, mask, patient draped       Skin prep: Chloraprep       Local skin infiltrated with 3 mL of 1% lidocaine.        Insertion Site: L3-4. (midline approach).       Technique: LORT saline        TINY at 5 cm.       Needle Type: ToTipHivey needle       Needle Gauge: 17.        Needle Length (Inches): 3.5        Catheter: 19 G.          Catheter threaded easily.           Threaded 11 cm at skin.         # of attempts: 1 and  # of redirects:  0    Assessment/Narrative         Paresthesias: No.       Test dose of 3 mL lidocaine 1.5% w/ 1:200,000 epinephrine at 08:34 CDT.        .       Insertion/Infusion Method: LORT saline       Aspiration negative for Heme or CSF via Epidural Catheter.    Medication(s) Administered   0.25% Bupivacaine PF (Epidural) - EPIDURAL   10 mL - 3/26/2025 8:38:00 AM  0.125% Bupivacaine + 2 mcg/mL Fentanyl via CADD (Epidural) - EPIDURAL   10 mL - 3/26/2025 8:39:00 AM  Medication Administration Time: 3/26/2025 8:25 AM     Comments:  Pt noted improvement in pain following bolus/infusion. VSS      FOR [facility]) ONLY:   Pain Team Contact information: please page the Pain Team [address]. Search \"Pain\". During daytime hours, please page the attending first. At night please page the resident first.      "

## 2025-03-26 NOTE — ANESTHESIA PREPROCEDURE EVALUATION
Anesthesia Pre-Procedure Evaluation    Patient: Corinne Mclain   MRN: [id number] : 1990        Procedure : * No procedures listed *          No past medical history on file.   Past Surgical History:   Procedure Laterality Date    wisdom teeth        Allergies   Allergen Reactions    Sulfa Antibiotics Hives      Social History     Tobacco Use    Smoking status: Never     Passive exposure: Never    Smokeless tobacco: Never   Substance Use Topics    Alcohol use: Never      Wt Readings from Last 1 Encounters:   25 85.2 kg (187 lb 12.8 oz)        Anesthesia Evaluation   Pt has had prior anesthetic. Type: Regional.        ROS/MED HX  ENT/Pulmonary:       Neurologic:       Cardiovascular:     (+)  hypertension- -   -  - -                                      METS/Exercise Tolerance:     Hematologic:       Musculoskeletal:       GI/Hepatic:       Renal/Genitourinary:       Endo:       Psychiatric/Substance Use:       Infectious Disease:       Malignancy:       Other:            Physical Exam    Airway  airway exam normal           Respiratory Devices and Support         Dental       (+) Minor Abnormalities - some fillings, tiny chips      Cardiovascular   cardiovascular exam normal          Pulmonary   pulmonary exam normal                OUTSIDE LABS:  CBC:   Lab Results   Component Value Date    WBC 9.2 2025    WBC 10.0 2025    HGB 13.1 2025    HGB 13.7 2025    HCT 38.4 2025    HCT 38.9 2025     2025     2025     BMP:   Lab Results   Component Value Date     (L) 2025     (L) 2025    POTASSIUM 4.0 2025    POTASSIUM 3.8 2025    CHLORIDE 101 2025    CHLORIDE 100 2025    CO2 20 (L) 2025    CO2 21 (L) 2025    BUN 5.6 (L) 2025    BUN 4.9 (L) 2025    CR 0.51 2025    CR 0.49 (L) 2025     (H) 2025     (H) 2025     COAGS: No results found for:  "\"PTT\", \"INR\", \"FIBR\"  POC: No results found for: \"BGM\", \"HCG\", \"HCGS\"  HEPATIC:   Lab Results   Component Value Date    ALBUMIN 3.7 03/26/2025    PROTTOTAL 6.4 03/26/2025    ALT 11 03/26/2025    AST 19 03/26/2025    ALKPHOS 155 (H) 03/26/2025    BILITOTAL 0.3 03/26/2025     OTHER:   Lab Results   Component Value Date    ALEXEI 9.1 03/26/2025       Anesthesia Plan    ASA Status:  2       Anesthesia Type: Epidural.              Consents    Anesthesia Plan(s) and associated risks, benefits, and realistic alternatives discussed. Questions answered and patient/representative(s) expressed understanding.     - Discussed:     - Discussed with:  Patient            Postoperative Care            Comments:               Loco Paula MD    Clinically Significant Risk Factors Present on Admission         # Hyponatremia: Lowest Na = 133 mmol/L in last 2 days, will monitor as appropriate                                  "

## 2025-03-26 NOTE — DISCHARGE SUMMARY
[facility] Discharge Summary    Corinne Mclain MRN# [id number]   Age: 34 year old YOB: 1990     Date of Admission:  3/26/2025  Date of Discharge::  {DISCHARGE DATE:298150}  Admitting Physician:  Martha Salcedo MD  Discharge Physician:  ***     Home clinic: [facility]          Admission Diagnoses:   Indication for care in labor or delivery [O75.9]  SROM  Spontaneous labor  Rubella non immune          Discharge Diagnosis:     Normal spontaneous vaginal delivery  Intrauterine pregnancy at 39 weeks gestation          Procedures:     Procedure(s): Placement of epidural  Repair of second degree perineal laceration  VAVD       No other procedures performed during this admission           Medications Prior to Admission:     Medications Prior to Admission   Medication Sig Dispense Refill Last Dose/Taking    Prenatal Vit-Fe Fumarate-FA (PRENATAL MULTIVITAMIN W/IRON) 27-0.8 MG tablet Take 1 tablet by mouth daily       [DISCONTINUED] acetaminophen (TYLENOL) 325 MG tablet Take 2 tablets (650 mg) by mouth every 4 hours as needed for mild pain or fever (greater than or equal to 38  C /100.4  F (oral) or 38.5  C/ 101.4  F (core).)       [DISCONTINUED] docusate sodium (COLACE) 100 MG capsule Take 1 capsule (100 mg) by mouth daily 30 capsule 0     [DISCONTINUED] ibuprofen (ADVIL/MOTRIN) 800 MG tablet Take 1 tablet (800 mg) by mouth every 6 hours as needed for other (cramping) 60 tablet 0              Discharge Medications:     Current Discharge Medication List        CONTINUE these medications which have CHANGED    Details   acetaminophen (TYLENOL) 325 MG tablet Take 2 tablets (650 mg) by mouth every 4 hours as needed for mild pain.  Qty: 40 tablet, Refills: 1    Associated Diagnoses: Indication for care in labor or delivery      docusate sodium (COLACE) 100 MG capsule Take 1 capsule (100 mg) by mouth daily.  Qty: 30 capsule, Refills: 0    Associated Diagnoses: Indication for care  in labor or delivery      ibuprofen (ADVIL/MOTRIN) 800 MG tablet Take 1 tablet (800 mg) by mouth every 6 hours as needed for other (cramping).  Qty: 60 tablet, Refills: 0    Associated Diagnoses: Indication for care in labor or delivery           CONTINUE these medications which have NOT CHANGED    Details   Prenatal Vit-Fe Fumarate-FA (PRENATAL MULTIVITAMIN W/IRON) 27-0.8 MG tablet Take 1 tablet by mouth daily                   Consultations:   No consultations were requested during this admission          Brief History of Labor:   Corinne Mclain is a 34 year old  who was admitted at 0245 3/26/25. She presented to triage, and was found to be ruptured. She was noted to be 3.5 cm dilated. This pregnancy was complicated by rubella non immune status, overweight status, labile BP. GBS negative, Rh positive. She was admitted to Labor and Delivery. Labor progressed, and epidural was administered. Pitocin augmentation was begun, as contractions were adequate. Rupture of membranes was spontaneous at 0200, and clear fluid was noted. She progressed to complete.     Baby then began a prolonged bradycardia, with 5-7 minutes between 50 and 90. Risks, benefits, and alternatives to expedited delivery were reviewed, and couple agreed to vacuum extraction. Jolly had just been removed, MARA position suspected, NICU team present. Adequate anesthesia was established with epidural. The patient was already in the dorsal lithotomy position. Fetal position was confirmed to be at +4 station with confirmed rupture of membranes. A disposable Kiwi vacuum extraction cup was appiled to the fetal median flexion point. It was centered over the sagittal sutures approximately 3cm anterior to the posterior fontanelle. The area around the vacuum cup was palpated to confirm that there was no maternal tissue within the cup margin. With the start of the contraction, a vacuum seal was established within the recommended range, and gentle traction  was applied. Advancement of the fetal head was noted with gentle traction applied to the device. A total of 1 pulls were performed, and 0 popoffs occurred. 0 reapplications were performed.    The vacuum was released upon  of the fetal head, and delivery was performed thereafter with the assistance of maternal expulsive efforts with contractions. Anterior shoulder delivered atraumatically by maternal expulsive efforts with the assistance of downward traction. The posterior shoulder was delivered with maternal expulsive efforts and upward traction. The remainder of the fetus delivered spontaneously. The infant was bulb-suctioned on abdomen. The cord was clamped and cut. Cord blood for gases collected and sent. The infant was then placed upon the mother's abdomen, being immediately vigorous.    The placenta delivered spontaneously, intact, with a 3-vessel cord. To enhance uterine contractions, 20 units of IV Pitocin were administered.  The vaginal vault and perineum were examined for any delivery-related injuries. The second degree laceration was repaired using 4.0 vicryl in the usual fashion.    At delivery, the infant was noted to be moving all extremities appropriately.  EBL=25ml           Hospital Course:   The patient's hospital course was unremarkable.  On discharge, her pain was well controlled. Vaginal bleeding is similar to peak menstrual flow.  Voiding without difficulty.  Ambulating well and tolerating a normal diet.  No fever.  Breastfeeding*** well.  Infant is stable.  No bowel movement yet.*  She was discharged on post-partum day #2***.    Post-partum hemoglobin:   Hemoglobin   Date Value Ref Range Status   2025 13.1 11.7 - 15.7 g/dL Final             Discharge Instructions and Follow-Up:     Discharge diet: Regular   Discharge activity: No driving or operating machinery while on narcotic analgesics  Pelvic rest: abstain from intercourse and do not use tampons for 6 week(s)   Discharge  follow-up: Follow up with primary care provider in 5 days for BP check   Wound care: Drink plenty of fluids  Ice to area for comfort           Discharge Disposition:     Discharged to home      ***

## 2025-03-26 NOTE — PROVIDER NOTIFICATION
03/26/25 0636   Provider Notification   Provider Name/Title Dr. Bender   Method of Notification Electronic Page     MD updated on patient status. Able to rest overnight but starting to get more uncomfortable. FHR category 1 & brando occasionally and irregularly. Orders received to start pitocin 2x2 if patient is amenable and does not need a check if patient refuses.

## 2025-03-26 NOTE — PLAN OF CARE
"Goal Outcome Evaluation:      Plan of Care Reviewed With: patient    Overall Patient Progress: improvingOverall Patient Progress: improving    Outcome Evaluation: Pt transferred from L+D at 1300. Vital signs stable. Uterine fundus is firm and midline. Scant vaginal bleeding. Pain well controlled. Up ad/brook. Has voided X1 after delivery. Breastfeeding. Will continue to monitor. Questions/concerns addressed.      Problem: Adult Inpatient Plan of Care  Goal: Plan of Care Review  Description: The Plan of Care Review/Shift note should be completed every shift.  The Outcome Evaluation is a brief statement about your assessment that the patient is improving, declining, or no change.  This information will be displayed automatically on your shiftnote.  Outcome: Progressing  Flowsheets (Taken 3/26/2025 1402)  Outcome Evaluation: Pt transferred from L+D at 1300. Vital signs stable. Uterine fundus is firm and midline. Scant vaginal bleeding. Pain well controlled. Up ad/brook. Has voided X1 after delivery. Breastfeeding. Will continue to monitor. Questions/concerns addressed.  Plan of Care Reviewed With: patient  Overall Patient Progress: improving  Goal: Patient-Specific Goal (Individualized)  Description: You can add care plan individualizations to a care plan. Examples of Individualization might be:  \"Parent requests to be called daily at 9am for status\", \"I have a hard time hearing out of my right ear\", or \"Do not touch me to wake me up as it startlesme\".  Outcome: Progressing  Goal: Absence of Hospital-Acquired Illness or Injury  Outcome: Progressing  Goal: Optimal Comfort and Wellbeing  Outcome: Progressing  Goal: Readiness for Transition of Care  Outcome: Progressing     Problem: Postpartum (Vaginal Delivery)  Goal: Successful Parent Role Transition  Outcome: Progressing  Goal: Hemostasis  Outcome: Progressing  Goal: Absence of Infection Signs and Symptoms  Outcome: Progressing  Goal: Anesthesia/Sedation Recovery  Outcome: " Progressing  Goal: Optimal Pain Control and Function  Outcome: Progressing  Goal: Effective Urinary Elimination  Outcome: Progressing

## 2025-03-26 NOTE — H&P
"  2025    Corinne Duggan  [id number]            OB Admit History & Physical      Ms. Duggan  is here with leaking of fluid.    She has noticed leaking since about 0200 this morning. She arrived on labor and delivery within the hour, and was found to be  3-4cm. She progressed spontaneously    No LMP recorded. Patient is pregnant.   Her Estimated Date of Delivery: Mar 30, 2025  , making her 39w3d  wks.      Estimated body mass index is 34.35 kg/m  as calculated from the following:    Height as of 3/18/25: 1.575 m (5' 2\").    Weight as of this encounter: 85.2 kg (187 lb 12.8 oz).  Her prenatal course has been complicated by rubella non immune status, labile BP.    See prenatal for labs.  neg GBBS, Rubella NON Immune, RH positive    Estimated fetal weight= 3800gm       She is a 34 year old   Her OB history:   OB History    Para Term  AB Living   2 1 1 0 0 1   SAB IAB Ectopic Multiple Live Births   0 0 0 0 1      # Outcome Date GA Lbr Servando/2nd Weight Sex Type Anes PTL Lv   2 Current            1 Term 22 41w0d 06:57 / 03:47 3.84 kg (8 lb 7.5 oz) M Vag-Spont EPI N MELANIA      Name: NADEEN DUGGAN-CORINNE      Apgar1: 8  Apgar5: 9          No past medical history on file.       Past Surgical History:   Procedure Laterality Date    wisdom teeth           No current outpatient medications on file.       Allergies: Sulfa antibiotics      REVIEW OF SYSTEMS:  NEUROLOGIC:  Negative  EYES:  Negative  ENT:  Negative  GI:  Negative  BREAST:  Negative  :  Negative  GYN:  Negative  CV:  Negative  PULMONARY:  Negative  MUSCULOSKELETAL:  Negative  PSYCH:  Negative        Social History     Socioeconomic History    Marital status:      Spouse name: Not on file    Number of children: Not on file    Years of education: Not on file    Highest education level: Not on file   Occupational History    Not on file   Tobacco Use    Smoking status: Never     Passive exposure: Never    Smokeless tobacco: Never "   Substance and Sexual Activity    Alcohol use: Never    Drug use: Never    Sexual activity: Yes     Partners: Male   Other Topics Concern    Not on file   Social History Narrative    Not on file     Social Drivers of Health     Financial Resource Strain: Low Risk  (3/26/2025)    Financial Resource Strain     Within the past 12 months, have you or your family members you live with been unable to get utilities (heat, electricity) when it was really needed?: No   Food Insecurity: Low Risk  (3/26/2025)    Food Insecurity     Within the past 12 months, did you worry that your food would run out before you got money to buy more?: No     Within the past 12 months, did the food you bought just not last and you didn t have money to get more?: No   Transportation Needs: Low Risk  (3/26/2025)    Transportation Needs     Within the past 12 months, has lack of transportation kept you from medical appointments, getting your medicines, non-medical meetings or appointments, work, or from getting things that you need?: No   Physical Activity: Not on file   Stress: Not on file   Social Connections: Not on file   Interpersonal Safety: Not on file   Housing Stability: Low Risk  (3/26/2025)    Housing Stability     Do you have housing? : Yes     Are you worried about losing your housing?: No      No family history on file.          Vitals:   FHT cat 1  With contractions every 5-7 min    Alert Awake in NAD  HEENT grossly normal  Neck: no lymphadenopathy or thryoidomegaly  Lungs CTAB  Back no spinal or CVAT  Heart RRR  ABD gravid, nontender on exam with vertex palpable  Pelvic:  clear fluid noted, no blood noted  Cervix is 3.5 cm / 80 % effaced at -2 station  EXT:  no edema or calf tenderness  Neuro:  grossly intact    Assessment/Plan:  IUP at 39w3d, admitted for SROM    #. Prenatal Care:   - OB labs reviewed: A, Rubella immune, Heb B Ag non-reactive, HIV negative, RPR negative  - Genetics: declined  - Anatomy ultrasound: level 2 US  normal  - Rh positive, Rhogam not indicated  -  at 28 weeks along with hgb 12.3, RPR   - S/p Flu, Tdap. S/p Covid vaccine (declines booster currently, last 1/2024)  - GBS negative  - Feed: breast and bottle, script given   - Contraception: IUD, had one in the past (liked Mirena)  - Peds: [facility]  - Continue taking prenatal vitamins    #. Overweight:  - Prepreg BMI 29, TWG rec 15-25lbs. Currently at 28 lb 3.2 oz (12.8 kg)    #. Hep B NI:  - Plans for vaccine series after delivery.     #. Labile BP:  Has had two elevated readings during pregnancy, one after rushing to appointment and another which was a repeat after a normal reading. See clinic note from 3/14.   Labs and NST normal previously.   BP normal today, has cuff at home and is checking BPs.    Neha Malik MD  Dept of OB/GYN  March 26, 2025

## 2025-03-26 NOTE — PLAN OF CARE
Data: Corinne Mclain transferred to 446 via wheelchair at 1300. Baby transferred via parent's arms.  Action: Receiving unit notified of transfer: Yes. Patient and family notified of room change. Report given to CHRIS Rao at 1305. Belongings sent to receiving unit. Accompanied by Registered Nurse. Oriented patient to surroundings. Call light within reach. ID bands double-checked with receiving RN.  Response: Patient tolerated transfer and is stable.

## 2025-03-26 NOTE — PLAN OF CARE
Data: Patient presented to [facility]: 3/26/2025  2:36 AM.  Reason for maternal/fetal assessment is leaking vaginal fluid. Patient reports her water breaking at 0200.  Patient is a .  Prenatal record reviewed. Pregnancy has been uncomplicated..  Gestational Age 39w3d. VSS. Fetal movement active. Patient denies vaginal bleeding, abdominal pain, pelvic pressure, nausea, vomiting, headache, visual disturbances, epigastric or URQ pain, significant edema. Support person is present.   Action: Verbal consent for EFM. Triage assessment completed. Bill of rights reviewed.  Response: Patient verbalized agreement with plan. Will contact Dr Martha Salcedo with update and for further orders.

## 2025-03-26 NOTE — PROVIDER NOTIFICATION
03/26/25 0852   Provider Notification   Provider Name/Title Dr. Malik   Method of Notification At Bedside   Request Evaluate in Person   Notification Reason Labor Status     MD at bedside, pt just received epidural and is beginning to get more comfortable. Pt requested SVE prior to making decision about starting pitocin. SVE 4.5/60-2, pt then began ctx q.2-4 min and ctx became much more intense and pt requested epidural. Plan to recheck SVE and place mariee when pt comfortable and discuss starting pitocin if needed at that time. MD in department.

## 2025-03-26 NOTE — PROGRESS NOTES
[facility]  Postpartum Progress Note    Corinne Mclain YOB: 1990   MRN [id number] Primary OB: [facility]     SUBJECTIVE   Corinne Mclain is a 34 year old  s/p VAVD on 3/26 at 39w3d.     Corinne reports she is doing well this morning.   Pain is mild, well controlled. Lochia is light.  Ambulating, voiding spontaneously, tolerating general diet without nausea/vomiting.   Baby Ky is doing well. They are breastfeeding and it is going well so far.  Denies headache, vision changes, chest pain, SOB, RUQ pain, increased swelling.              OBJECTIVE   Patient Vitals for the past 24 hrs:   BP Temp Temp src Pulse Resp SpO2   25 0740 127/90 98  F (36.7  C) Oral 100 16 --   25 0609 (!) 138/94 98.2  F (36.8  C) Oral 83 16 --   25 0142 133/90 97.7  F (36.5  C) Oral 94 16 --   25 2130 134/85 98.3  F (36.8  C) Oral 82 18 --   25 1701 138/88 98.5  F (36.9  C) Oral 85 18 --   25 1319 136/89 97.7  F (36.5  C) Oral 94 18 --   25 1240 128/76 -- -- -- -- --   25 1225 123/73 -- -- -- -- --   25 1210 115/59 -- -- -- -- --   25 1155 116/56 -- -- -- -- --   25 1140 114/53 -- -- -- -- --   25 1125 123/63 -- -- -- -- --   25 1110 118/62 -- -- -- -- --   25 1055 120/75 98.4  F (36.9  C) Oral -- 20 --   25 1025 120/75 -- -- -- -- --   25 1008 121/76 -- -- -- -- --   25 0954 107/67 -- -- -- -- --   25 0950 -- 98.2  F (36.8  C) Oral -- -- 97 %   25 0934 110/63 -- -- -- -- --   25 0928 102/61 -- -- -- -- --   25 0903 110/67 -- -- -- -- --   25 0858 114/71 -- -- -- -- --   25 0853 105/67 -- -- -- -- --   25 0851 109/69 -- -- -- -- --   25 0849 112/74 -- -- -- -- --   25 0847 119/78 -- -- -- -- --   25 0845 116/76 97.5  F (36.4  C) Oral -- -- --   25 0843 120/82 -- -- -- -- --     Physical Exam  General: Alert, in no acute  distress, resting comfortably in bed.   Neuro: Grossly normal to observation.  Psych: Alert, oriented, affect appropriate.  Cardiovascular: Normal rate, wwp.   Respiratory: Nonlabored breathing, equal chest rise/fall bilaterally.   Abdomen: Appropriately tender to palpation, fundus firm below umbilicus.   Skin: Color, texture, turgor normal. No concerning rashes or lesions.    Labs  Recent Labs   Lab Test 25  0714 25  0423 25  0931 22  0852 01/15/22  1307   HGB 12.4 13.1 13.7 13.3 14.6    170 172  --  286   CR 0.66 0.51 0.49*  --   --    AST 28 19 19  --   --    ALT 11 11 11  --   --           ASSESSMENT & PLAN   Corinne Mclain is a 34 year old  PPD#1 from [facility] on 3/26/25 at 39w3d.    #. Postpartum Care:   - Recovering well with no acute concerns.  - Rh positive, Rhogam not indicated.  - Vaccines: Rubella NONimmune - MMR prior to discharge  - Infant Feeding: breastfeeding, going well  - PP Depression Risk: average  - Perineal Care: routine  - VTE Prophylaxis: ambulation  - Contraception: interval Mirena IUD    #. Gestational Hypertension:  - HELLP labs trend as above.   - Starting Nifedipine 30mg daily today.     #. Disposition:   - Anticipated discharge on PPD#2-3, pending BP control  - Follow up in 1wk for BP check and 6wks for full postpartum visit.     Lauren MacNeill, MD [facility]  [phone]  2025 8:27 AM

## 2025-03-26 NOTE — L&D DELIVERY NOTE
Corinne Mclain is a 34 year old  who was admitted at 0245 3/26/25. She presented to triage, and was found to be ruptured. She was noted to be 3.5 cm dilated. This pregnancy was complicated by rubella non immune status, overweight status, labile BP. GBS negative, Rh positive. She was admitted to Labor and Delivery. Labor progressed, and epidural was administered. Pitocin augmentation was begun, as contractions were adequate. Rupture of membranes was spontaneous at 0200, and clear fluid was noted. She progressed to complete.     Baby then began a prolonged bradycardia, with 5-7 minutes between 50 and 90. Risks, benefits, and alternatives to expedited delivery were reviewed, and couple agreed to vacuum extraction. Jolly had just been removed, MARA position suspected, NICU team present. Adequate anesthesia was established with epidural. The patient was already in the dorsal lithotomy position. Fetal position was confirmed to be at +4 station with confirmed rupture of membranes. A disposable Kiwi vacuum extraction cup was appiled to the fetal median flexion point. It was centered over the sagittal sutures approximately 3cm anterior to the posterior fontanelle. The area around the vacuum cup was palpated to confirm that there was no maternal tissue within the cup margin. With the start of the contraction, a vacuum seal was established within the recommended range, and gentle traction was applied. Advancement of the fetal head was noted with gentle traction applied to the device. A total of 1 pulls were performed, and 0 popoffs occurred. 0 reapplications were performed.    The vacuum was released upon  of the fetal head, and delivery was performed thereafter with the assistance of maternal expulsive efforts with contractions. Anterior shoulder delivered atraumatically by maternal expulsive efforts with the assistance of downward traction. The posterior shoulder was delivered with maternal expulsive efforts  and upward traction. The remainder of the fetus delivered spontaneously. The infant was bulb-suctioned on abdomen. The cord was clamped and cut. Cord blood for gases collected and sent. The infant was then placed upon the mother's abdomen, being immediately vigorous.    The placenta delivered spontaneously, intact, with a 3-vessel cord. To enhance uterine contractions, 20 units of IV Pitocin were administered.  The vaginal vault and perineum were examined for any delivery-related injuries. The second degree laceration was repaired using 4.0 vicryl in the usual fashion.    At delivery, the infant was noted to be moving all extremities appropriately.  EBL=25ml    Neha Malik MD on 3/26/2025 at 12:15 PM

## 2025-03-27 LAB
ALBUMIN SERPL BCG-MCNC: 3.5 G/DL (ref 3.5–5.2)
ALP SERPL-CCNC: 141 U/L (ref 40–150)
ALT SERPL W P-5'-P-CCNC: 11 U/L (ref 0–50)
ANION GAP SERPL CALCULATED.3IONS-SCNC: 12 MMOL/L (ref 7–15)
AST SERPL W P-5'-P-CCNC: 28 U/L (ref 0–45)
BILIRUB SERPL-MCNC: 0.3 MG/DL
BUN SERPL-MCNC: 5.4 MG/DL (ref 6–20)
CALCIUM SERPL-MCNC: 8.7 MG/DL (ref 8.8–10.4)
CHLORIDE SERPL-SCNC: 102 MMOL/L (ref 98–107)
CREAT SERPL-MCNC: 0.66 MG/DL (ref 0.51–0.95)
EGFRCR SERPLBLD CKD-EPI 2021: >90 ML/MIN/1.73M2
ERYTHROCYTE [DISTWIDTH] IN BLOOD BY AUTOMATED COUNT: 12.3 % (ref 10–15)
GLUCOSE SERPL-MCNC: 88 MG/DL (ref 70–99)
HCO3 SERPL-SCNC: 22 MMOL/L (ref 22–29)
HCT VFR BLD AUTO: 36.2 % (ref 35–47)
HGB BLD-MCNC: 12.4 G/DL (ref 11.7–15.7)
MCH RBC QN AUTO: 29.5 PG (ref 26.5–33)
MCHC RBC AUTO-ENTMCNC: 34.3 G/DL (ref 31.5–36.5)
MCV RBC AUTO: 86 FL (ref 78–100)
PLATELET # BLD AUTO: 168 10E3/UL (ref 150–450)
POTASSIUM SERPL-SCNC: 4.1 MMOL/L (ref 3.4–5.3)
PROT SERPL-MCNC: 6 G/DL (ref 6.4–8.3)
RBC # BLD AUTO: 4.2 10E6/UL (ref 3.8–5.2)
SODIUM SERPL-SCNC: 136 MMOL/L (ref 135–145)
WBC # BLD AUTO: 10.9 10E3/UL (ref 4–11)

## 2025-03-27 PROCEDURE — 250N000013 HC RX MED GY IP 250 OP 250 PS 637: Performed by: OBSTETRICS & GYNECOLOGY

## 2025-03-27 PROCEDURE — 84155 ASSAY OF PROTEIN SERUM: CPT | Performed by: OBSTETRICS & GYNECOLOGY

## 2025-03-27 PROCEDURE — 120N000001 HC R&B MED SURG/OB

## 2025-03-27 PROCEDURE — 36415 COLL VENOUS BLD VENIPUNCTURE: CPT | Performed by: OBSTETRICS & GYNECOLOGY

## 2025-03-27 PROCEDURE — 85018 HEMOGLOBIN: CPT | Performed by: OBSTETRICS & GYNECOLOGY

## 2025-03-27 PROCEDURE — 84132 ASSAY OF SERUM POTASSIUM: CPT | Performed by: OBSTETRICS & GYNECOLOGY

## 2025-03-27 PROCEDURE — 250N000013 HC RX MED GY IP 250 OP 250 PS 637: Performed by: STUDENT IN AN ORGANIZED HEALTH CARE EDUCATION/TRAINING PROGRAM

## 2025-03-27 PROCEDURE — 999N000079 HC STATISTIC IP LACTATION SERVICES 1-15 MIN

## 2025-03-27 RX ORDER — NALOXONE HYDROCHLORIDE 0.4 MG/ML
0.2 INJECTION, SOLUTION INTRAMUSCULAR; INTRAVENOUS; SUBCUTANEOUS
Status: DISCONTINUED | OUTPATIENT
Start: 2025-03-27 | End: 2025-03-29 | Stop reason: HOSPADM

## 2025-03-27 RX ORDER — NALOXONE HYDROCHLORIDE 0.4 MG/ML
0.4 INJECTION, SOLUTION INTRAMUSCULAR; INTRAVENOUS; SUBCUTANEOUS
Status: DISCONTINUED | OUTPATIENT
Start: 2025-03-27 | End: 2025-03-29 | Stop reason: HOSPADM

## 2025-03-27 RX ORDER — NIFEDIPINE 30 MG/1
30 TABLET, EXTENDED RELEASE ORAL EVERY 12 HOURS
Status: DISCONTINUED | OUTPATIENT
Start: 2025-03-27 | End: 2025-03-28

## 2025-03-27 RX ORDER — NIFEDIPINE 30 MG/1
30 TABLET, EXTENDED RELEASE ORAL DAILY
Status: DISCONTINUED | OUTPATIENT
Start: 2025-03-27 | End: 2025-03-27

## 2025-03-27 RX ADMIN — IBUPROFEN 800 MG: 800 TABLET, FILM COATED ORAL at 21:06

## 2025-03-27 RX ADMIN — PSYLLIUM HUSK 1 PACKET: 3.4 POWDER ORAL at 09:25

## 2025-03-27 RX ADMIN — IBUPROFEN 800 MG: 800 TABLET, FILM COATED ORAL at 14:09

## 2025-03-27 RX ADMIN — IBUPROFEN 800 MG: 800 TABLET, FILM COATED ORAL at 01:34

## 2025-03-27 RX ADMIN — NIFEDIPINE 30 MG: 30 TABLET, FILM COATED, EXTENDED RELEASE ORAL at 09:25

## 2025-03-27 RX ADMIN — NIFEDIPINE 30 MG: 30 TABLET, FILM COATED, EXTENDED RELEASE ORAL at 21:06

## 2025-03-27 RX ADMIN — ACETAMINOPHEN 650 MG: 325 TABLET, FILM COATED ORAL at 02:35

## 2025-03-27 RX ADMIN — ACETAMINOPHEN 650 MG: 325 TABLET, FILM COATED ORAL at 07:13

## 2025-03-27 RX ADMIN — IBUPROFEN 800 MG: 800 TABLET, FILM COATED ORAL at 07:40

## 2025-03-27 ASSESSMENT — ACTIVITIES OF DAILY LIVING (ADL)
ADLS_ACUITY_SCORE: 23

## 2025-03-27 NOTE — PROVIDER NOTIFICATION
03/27/25 1420   Provider Notification   Provider Name/Title    Method of Notification Electronic Page   Request Evaluate-Remote   Notification Reason Other       MD paged due to pt inquiring if there is a possibility of discharge this evening. Also, clarified frequency of vitals for pt.     Per MD pt should stay until tomorrow. Q4 vitals while awake.

## 2025-03-27 NOTE — PROVIDER NOTIFICATION
03/27/25 0619   Provider Notification   Provider Name/Title Dr. Malik   Method of Notification Electronic Page   Request Evaluate-Remote   Notification Reason Vital Signs Change     Notified provider of 2 most recent mildly elevated Bps. Provider put order in for labs as well as Nifedipine 30mg daily.

## 2025-03-27 NOTE — PLAN OF CARE
"Goal Outcome Evaluation:      Plan of Care Reviewed With: patient    Overall Patient Progress: improving    Outcome Evaluation: Pt progressing on care plan goals    Data: Vital signs within normal limits. Postpartum checks within normal limits - see flow record. Patient eating and drinking normally. Patient able to empty bladder independently and is up ambulating. Patient performing self cares, is able to care for infant and is breastfeeding every 2-3 hours. Using ice and shelli bottle for perineal discomfort.   Action: Patient medicated with ibuprofen and tylenol during the shift for pain. See MAR. Adequate pain control noted by patient. Patient education done, see flow record.  Response: Positive attachment behaviors observed with infant. Patient's spouse present this shift and supportive.   Plan: Continue current plan of care.  Anticipate discharge in 1-2 days. Will continue to monitor and treat.     Problem: Adult Inpatient Plan of Care  Goal: Plan of Care Review  Description: The Plan of Care Review/Shift note should be completed every shift.  The Outcome Evaluation is a brief statement about your assessment that the patient is improving, declining, or no change.  This information will be displayed automatically on your shiftnote.  Outcome: Progressing  Flowsheets (Taken 3/26/2025 2110)  Outcome Evaluation: Pt progressing on care plan goals  Plan of Care Reviewed With: patient  Overall Patient Progress: improving  Goal: Patient-Specific Goal (Individualized)  Description: You can add care plan individualizations to a care plan. Examples of Individualization might be:  \"Parent requests to be called daily at 9am for status\", \"I have a hard time hearing out of my right ear\", or \"Do not touch me to wake me up as it startlesme\".  Outcome: Progressing  Goal: Absence of Hospital-Acquired Illness or Injury  Outcome: Progressing  Intervention: Prevent Infection  Recent Flowsheet Documentation  Taken 3/26/2025 1701 by " Madyson Vazquez, RN  Infection Prevention:   rest/sleep promoted   hand hygiene promoted  Goal: Optimal Comfort and Wellbeing  Outcome: Progressing  Intervention: Monitor Pain and Promote Comfort  Recent Flowsheet Documentation  Taken 3/26/2025 1701 by Madyson Vazquez, RN  Pain Management Interventions: medication (see MAR)  Intervention: Provide Person-Centered Care  Recent Flowsheet Documentation  Taken 3/26/2025 1701 by Madyson Vazquez, RN  Trust Relationship/Rapport:   care explained   choices provided   emotional support provided   questions answered   empathic listening provided   questions encouraged   reassurance provided   thoughts/feelings acknowledged  Goal: Readiness for Transition of Care  Outcome: Progressing     Problem: Postpartum (Vaginal Delivery)  Goal: Successful Parent Role Transition  Outcome: Progressing  Goal: Hemostasis  Outcome: Progressing  Goal: Absence of Infection Signs and Symptoms  Outcome: Progressing  Goal: Anesthesia/Sedation Recovery  Outcome: Progressing  Goal: Optimal Pain Control and Function  Outcome: Progressing  Intervention: Prevent or Manage Pain  Recent Flowsheet Documentation  Taken 3/26/2025 1701 by Madyson Vazquez, RN  Pain Management Interventions: medication (see MAR)  Goal: Effective Urinary Elimination  Outcome: Progressing

## 2025-03-27 NOTE — LACTATION NOTE
Lactation visit with patient. Second baby for Corinne. Baby at breast at time of visit with a deep latch. Nearing end of feed, sucks are intermittent with stimulation only. Patient states history of low milk supply with her first despite a good start in hospital. Had to move to combo feeding with formula. Reviewed signs of hydration, listening for swallows, encouraged breast compressions with feeds to keep milk moving. Discussed option of pumping as well for increase stimulation. Importance of STS and hand expressing reviewed. Questions answered.

## 2025-03-27 NOTE — PLAN OF CARE
"Goal Outcome Evaluation:      Plan of Care Reviewed With: patient    Overall Patient Progress: improvingOverall Patient Progress: improving    Outcome Evaluation: Vital signs stable. Denies symptoms of preeclampsia. Postpartum assessment WDL. Pain well controlled. Up ad/brook. Voiding w/o difficulty.  Breastfeeding. Will continue to monitor. Questions/concerns addressed.      Problem: Adult Inpatient Plan of Care  Goal: Plan of Care Review  Description: The Plan of Care Review/Shift note should be completed every shift.  The Outcome Evaluation is a brief statement about your assessment that the patient is improving, declining, or no change.  This information will be displayed automatically on your shiftnote.  Outcome: Progressing  Flowsheets (Taken 3/27/2025 1500)  Outcome Evaluation: Vital signs stable. Denies symptoms of preeclampsia. Postpartum assessment WDL. Pain well controlled. Up ad/brook. Voiding w/o difficulty.  Breastfeeding. Will continue to monitor. Questions/concerns addressed.  Plan of Care Reviewed With: patient  Overall Patient Progress: improving  Goal: Patient-Specific Goal (Individualized)  Description: You can add care plan individualizations to a care plan. Examples of Individualization might be:  \"Parent requests to be called daily at 9am for status\", \"I have a hard time hearing out of my right ear\", or \"Do not touch me to wake me up as it startlesme\".  Outcome: Progressing  Goal: Absence of Hospital-Acquired Illness or Injury  Outcome: Progressing  Intervention: Prevent Skin Injury  Recent Flowsheet Documentation  Taken 3/27/2025 0740 by Anitha Schrader, RN  Body Position: position changed independently  Intervention: Prevent Infection  Recent Flowsheet Documentation  Taken 3/27/2025 0740 by Anitha Schrader RN  Infection Prevention:   rest/sleep promoted   hand hygiene promoted  Goal: Optimal Comfort and Wellbeing  Outcome: Progressing  Intervention: Monitor Pain and Promote " Comfort  Recent Flowsheet Documentation  Taken 3/27/2025 0740 by Anitha Schrader RN  Pain Management Interventions: medication (see MAR)  Intervention: Provide Person-Centered Care  Recent Flowsheet Documentation  Taken 3/27/2025 0740 by Anitha Schrader RN  Trust Relationship/Rapport:   care explained   choices provided   questions answered   reassurance provided  Goal: Readiness for Transition of Care  Outcome: Progressing     Problem: Postpartum (Vaginal Delivery)  Goal: Successful Parent Role Transition  Outcome: Progressing  Intervention: Support Parent Role Transition  Recent Flowsheet Documentation  Taken 3/27/2025 0740 by Anitha Schrader RN  Supportive Measures:   active listening utilized   verbalization of feelings encouraged   self-care encouraged   positive reinforcement provided  Parent-Child Attachment Promotion:   caring behavior modeled   positive reinforcement provided   rooming-in promoted   strengths emphasized  Goal: Hemostasis  Outcome: Progressing  Goal: Absence of Infection Signs and Symptoms  Outcome: Progressing  Goal: Anesthesia/Sedation Recovery  Outcome: Progressing  Goal: Optimal Pain Control and Function  Outcome: Progressing  Intervention: Prevent or Manage Pain  Recent Flowsheet Documentation  Taken 3/27/2025 0740 by Anitha Schrader RN  Pain Management Interventions: medication (see MAR)  Goal: Effective Urinary Elimination  Outcome: Progressing

## 2025-03-27 NOTE — PLAN OF CARE
Mildly elevated BPs overnight (MD aware, check provider notification note), otherwise vitally stable. Pt denies Pre-E symptoms. Fundus/lochia WDL. Voiding appropriately and ambulating independently. Pain adequately controlled with PRN medications. Breastfeeding infant independently. Educated pt on importance of feeding infant q2-3 hrs. S/o at bedside, supportive.     Problem: Adult Inpatient Plan of Care  Goal: Plan of Care Review  Description: The Plan of Care Review/Shift note should be completed every shift.  The Outcome Evaluation is a brief statement about your assessment that the patient is improving, declining, or no change.  This information will be displayed automatically on your shiftnote.  Outcome: Progressing  Flowsheets (Taken 3/27/2025 0251)  Plan of Care Reviewed With: patient  Overall Patient Progress: improving  Goal: Absence of Hospital-Acquired Illness or Injury  Intervention: Prevent Skin Injury  Recent Flowsheet Documentation  Taken 3/27/2025 0142 by Octavia Garza RN  Body Position: position changed independently  Intervention: Prevent and Manage VTE (Venous Thromboembolism) Risk  Recent Flowsheet Documentation  Taken 3/27/2025 0142 by Octavia Garza RN  VTE Prevention/Management: SCDs off (sequential compression devices)  Intervention: Prevent Infection  Recent Flowsheet Documentation  Taken 3/27/2025 0142 by Octavia Garza RN  Infection Prevention:   rest/sleep promoted   hand hygiene promoted  Goal: Optimal Comfort and Wellbeing  Intervention: Monitor Pain and Promote Comfort  Recent Flowsheet Documentation  Taken 3/27/2025 0142 by Octavia Garza RN  Pain Management Interventions: medication (see MAR)  Intervention: Provide Person-Centered Care  Recent Flowsheet Documentation  Taken 3/27/2025 0142 by Octavia Garza RN  Trust Relationship/Rapport:   care explained   choices provided   questions answered   reassurance provided     Problem: Labor  Goal: Stable Fetal Wellbeing  Intervention: Promote  and Monitor Fetal Wellbeing  Recent Flowsheet Documentation  Taken 3/27/2025 0142 by Octavia Garza RN  Body Position: position changed independently  Goal: Absence of Infection Signs and Symptoms  Intervention: Prevent or Manage Infection  Recent Flowsheet Documentation  Taken 3/27/2025 0142 by Octavia Garza RN  Infection Prevention:   rest/sleep promoted   hand hygiene promoted     Problem: Postpartum (Vaginal Delivery)  Goal: Optimal Pain Control and Function  Intervention: Prevent or Manage Pain  Recent Flowsheet Documentation  Taken 3/27/2025 0142 by Octavia Garza RN  Pain Management Interventions: medication (see MAR)   Goal Outcome Evaluation:      Plan of Care Reviewed With: patient    Overall Patient Progress: improvingOverall Patient Progress: improving

## 2025-03-27 NOTE — ANESTHESIA POSTPROCEDURE EVALUATION
Patient: Corinne Mclain        S/P epidural for labor.   I or my partner was immediately available for management of this patient during epidural analgesia infusion.  VSS.  Doing well. Block resolved.  Neuro at baseline. Denies positional headache. Minimal side effects easily managed w/ PRN meds. No apparent anesthetic complications. No follow-up required.    Juan David Bray MD         Anesthesia Type:  No value filed.    Note:  Anesthesia Post Evaluation    Last vitals:  Vitals:    03/27/25 0142 03/27/25 0609 03/27/25 0740   BP: 133/90 (!) 138/94 127/90   Pulse: 94 83 100   Resp: 16 16 16   Temp: 97.7  F (36.5  C) 98.2  F (36.8  C) 98  F (36.7  C)   SpO2:          Electronically Signed By: Juan David Bray MD  March 27, 2025  8:07 AM

## 2025-03-28 VITALS
TEMPERATURE: 98 F | RESPIRATION RATE: 16 BRPM | OXYGEN SATURATION: 97 % | HEART RATE: 79 BPM | SYSTOLIC BLOOD PRESSURE: 118 MMHG | WEIGHT: 187.8 LBS | BODY MASS INDEX: 34.35 KG/M2 | DIASTOLIC BLOOD PRESSURE: 86 MMHG

## 2025-03-28 PROCEDURE — 250N000013 HC RX MED GY IP 250 OP 250 PS 637: Performed by: OBSTETRICS & GYNECOLOGY

## 2025-03-28 PROCEDURE — 120N000001 HC R&B MED SURG/OB

## 2025-03-28 RX ORDER — NIFEDIPINE 30 MG/1
60 TABLET, EXTENDED RELEASE ORAL EVERY MORNING
Status: DISCONTINUED | OUTPATIENT
Start: 2025-03-28 | End: 2025-03-29 | Stop reason: HOSPADM

## 2025-03-28 RX ORDER — NIFEDIPINE 30 MG/1
30 TABLET, EXTENDED RELEASE ORAL EVERY 24 HOURS
Status: DISCONTINUED | OUTPATIENT
Start: 2025-03-28 | End: 2025-03-29 | Stop reason: HOSPADM

## 2025-03-28 RX ADMIN — IBUPROFEN 800 MG: 800 TABLET, FILM COATED ORAL at 11:46

## 2025-03-28 RX ADMIN — NIFEDIPINE 30 MG: 30 TABLET, FILM COATED, EXTENDED RELEASE ORAL at 21:39

## 2025-03-28 RX ADMIN — NIFEDIPINE 60 MG: 30 TABLET, FILM COATED, EXTENDED RELEASE ORAL at 09:00

## 2025-03-28 RX ADMIN — IBUPROFEN 800 MG: 800 TABLET, FILM COATED ORAL at 03:26

## 2025-03-28 RX ADMIN — PSYLLIUM HUSK 1 PACKET: 3.4 POWDER ORAL at 08:35

## 2025-03-28 RX ADMIN — IBUPROFEN 800 MG: 800 TABLET, FILM COATED ORAL at 17:47

## 2025-03-28 RX ADMIN — ACETAMINOPHEN 650 MG: 325 TABLET, FILM COATED ORAL at 21:39

## 2025-03-28 ASSESSMENT — ACTIVITIES OF DAILY LIVING (ADL)
ADLS_ACUITY_SCORE: 23

## 2025-03-28 NOTE — PROGRESS NOTES
[facility]  Postpartum Progress Note    Corinne Mclain YOB: 1990   MRN [id number] Primary OB: [facility]     SUBJECTIVE   Corinne Mclain is a 34 year old  s/p VAVD on 3/26 at 39w3d.     Corinne reports she is doing well this morning.   Pain is mild, well controlled. Lochia is light.  Ambulating, voiding spontaneously, tolerating general diet without nausea/vomiting.   Baby Ky is doing well. They are breastfeeding and it is going well so far.  Denies headache, vision changes, chest pain, SOB, RUQ pain, increased swelling.              OBJECTIVE   Patient Vitals for the past 24 hrs:   BP Temp Temp src Pulse Resp   25 0326 135/90 -- -- 76 --   25 0105 125/90 97.6  F (36.4  C) Oral 83 18   25 2110 118/86 -- -- 79 --   25 1701 (!) 134/92 98  F (36.7  C) Oral 79 16   25 1408 116/81 -- -- 95 16   25 1153 124/74 -- -- 83 --     Physical Exam  General: Alert, in no acute distress.  Neuro: Grossly normal to observation. No hyperreflexia.   Psych: Alert, oriented, affect appropriate.  Cardiovascular: Normal rate.  Respiratory: Nonlabored breathing.  Abdomen: Appropriately tender to palpation, fundus firm below umbilicus.   Skin: Color, texture, turgor normal. No concerning rashes or lesions.    Labs  Recent Labs   Lab Test 25  0714 25  0423 25  0931 22  0852 01/15/22  1307   HGB 12.4 13.1 13.7 13.3 14.6    170 172  --  286   CR 0.66 0.51 0.49*  --   --    AST 28 19 19  --   --    ALT 11 11 11  --   --           ASSESSMENT & PLAN   Corinne Mclain is a 34 year old  PPD#2 from VAVD on 3/26/25 at 39w3d.    #. Postpartum Care:   - Recovering well with no acute concerns.  - Rh positive, Rhogam not indicated.  - Vaccines: Rubella NONimmune - MMR prior to discharge given.  - Infant Feeding: breastfeeding, going well  - PP Depression Risk: average  - Perineal Care: routine  - VTE Prophylaxis: ambulation  -  Contraception: interval Mirena IUD    #. Gestational Hypertension:  - HELLP labs wnl.  - Increase Nifedipine to 60mg AM, 30mg PM, PO XR.     #. Disposition:   - Anticipated discharge on PPD#3-4, pending BP control  - Follow up in 1wk for BP check and 6wks for full postpartum visit.

## 2025-03-28 NOTE — PROVIDER NOTIFICATION
"   03/28/25 1750   Provider Notification   Provider Name/Title Dr Erickson   Method of Notification In Department   Notification Reason Status Update     MD in department and updated last blood pressure was 132/94. BP at 1440 was 123/92.Pt stated they now have a \"slight headache\" rated 3 out of 10. Just started this afternoon. Continue with current treatment plan.   "

## 2025-03-28 NOTE — PROVIDER NOTIFICATION
03/27/25 2146   Provider Notification   Provider Name/Title Dr Snow   Method of Notification Electronic Page   Request Evaluate-Remote   Notification Reason Vital Signs Change     Provider updated that pts most recent blood pressure was 118/86 before giving evening procardia dose. Provider okay with pt receiving BP med. Will continue to monitor.

## 2025-03-28 NOTE — PLAN OF CARE
"Goal Outcome Evaluation:      Plan of Care Reviewed With: patient    Overall Patient Progress: improving    Outcome Evaluation: Monitoring blood pressures this shift    Data: Pt had 1 mild range BP this shift, procardia dose increased to BID this evening. All other vital signs within normal limits. Postpartum checks within normal limits - see flow record. Patient eating and drinking normally. Patient able to empty bladder independently and is up ambulating. Patient performing self cares, is able to care for infant and is breastfeeding infant. Using nipple cream for discomfort.   Action: Patient medicated with ibuprofen during the shift for pain. See MAR. Adequate pain control noted by patient. Patient education done, see flow record.  Response: Positive attachment behaviors observed with infant. Patient's spouse present this shift and supportive.  Plan: Continue current plan of care.  Anticipate discharge on 3/28/25. Will continue to monitor and treat.     Problem: Adult Inpatient Plan of Care  Goal: Plan of Care Review  Description: The Plan of Care Review/Shift note should be completed every shift.  The Outcome Evaluation is a brief statement about your assessment that the patient is improving, declining, or no change.  This information will be displayed automatically on your shiftnote.  Outcome: Progressing  Flowsheets (Taken 3/27/2025 2152)  Outcome Evaluation: Monitoring blood pressures this shift  Plan of Care Reviewed With: patient  Overall Patient Progress: improving  Goal: Patient-Specific Goal (Individualized)  Description: You can add care plan individualizations to a care plan. Examples of Individualization might be:  \"Parent requests to be called daily at 9am for status\", \"I have a hard time hearing out of my right ear\", or \"Do not touch me to wake me up as it startlesme\".  Outcome: Progressing  Goal: Absence of Hospital-Acquired Illness or Injury  Outcome: Progressing  Intervention: Prevent and Manage " VTE (Venous Thromboembolism) Risk  Recent Flowsheet Documentation  Taken 3/27/2025 1701 by Madyson Vazquez, RN  VTE Prevention/Management: SCDs off (sequential compression devices)  Intervention: Prevent Infection  Recent Flowsheet Documentation  Taken 3/27/2025 1701 by Madyson Vazquez, RN  Infection Prevention:   hand hygiene promoted   rest/sleep promoted  Goal: Optimal Comfort and Wellbeing  Outcome: Progressing  Intervention: Provide Person-Centered Care  Recent Flowsheet Documentation  Taken 3/27/2025 1701 by Madyson Vazquez, RN  Trust Relationship/Rapport:   care explained   choices provided   emotional support provided   empathic listening provided   questions answered   questions encouraged   reassurance provided   thoughts/feelings acknowledged  Goal: Readiness for Transition of Care  Outcome: Progressing     Problem: Postpartum (Vaginal Delivery)  Goal: Successful Parent Role Transition  Outcome: Progressing  Goal: Hemostasis  Outcome: Progressing  Goal: Absence of Infection Signs and Symptoms  Outcome: Progressing  Goal: Anesthesia/Sedation Recovery  Outcome: Progressing  Goal: Optimal Pain Control and Function  Outcome: Progressing  Goal: Effective Urinary Elimination  Outcome: Progressing

## 2025-03-28 NOTE — PLAN OF CARE
VSS.  Pain well controlled. Fundal checks and bleeding WNL. Voiding without difficulty, frequent voiding encouraged.  Independent with breastfeeding.  FOB present and is supportive.        Problem: Adult Inpatient Plan of Care  Goal: Plan of Care Review    Outcome: Progressing  Flowsheets (Taken 3/28/2025 0556)  Plan of Care Reviewed With: patient  Overall Patient Progress: improving  Goal: Absence of Hospital-Acquired Illness or Injury  Intervention: Prevent Skin Injury  Recent Flowsheet Documentation  Taken 3/28/2025 0105 by Chandrika Saldana RN  Body Position: position changed independently  Intervention: Prevent and Manage VTE (Venous Thromboembolism) Risk  Recent Flowsheet Documentation  Taken 3/28/2025 0105 by Chandrika Saldana RN  VTE Prevention/Management: SCDs off (sequential compression devices)  Intervention: Prevent Infection  Recent Flowsheet Documentation  Taken 3/28/2025 0105 by Chandrika Saldana RN  Infection Prevention:   rest/sleep promoted   single patient room provided  Goal: Optimal Comfort and Wellbeing  Intervention: Monitor Pain and Promote Comfort  Recent Flowsheet Documentation  Taken 3/28/2025 0326 by Chandrika Saldana RN  Pain Management Interventions: medication (see MAR)  Intervention: Provide Person-Centered Care  Recent Flowsheet Documentation  Taken 3/28/2025 0105 by Chandrika Saldana RN  Trust Relationship/Rapport:   care explained   choices provided   questions answered     Problem: Labor  Goal: Stable Fetal Wellbeing  Intervention: Promote and Monitor Fetal Wellbeing  Recent Flowsheet Documentation  Taken 3/28/2025 0105 by Chandrika Saldana RN  Body Position: position changed independently  Goal: Absence of Infection Signs and Symptoms  Intervention: Prevent or Manage Infection  Recent Flowsheet Documentation  Taken 3/28/2025 0105 by Chandrika Saldana RN  Infection Prevention:   rest/sleep promoted   single patient room provided  Infection Management: aseptic technique  maintained     Problem: Postpartum (Vaginal Delivery)  Goal: Successful Parent Role Transition  Intervention: Support Parent Role Transition  Recent Flowsheet Documentation  Taken 3/28/2025 0105 by Chandrika Saldana RN  Supportive Measures: active listening utilized  Parent-Child Attachment Promotion:   caring behavior modeled   interaction encouraged   parent/caregiver presence encouraged   participation in care promoted  Goal: Absence of Infection Signs and Symptoms  Intervention: Prevent or Manage Infection  Recent Flowsheet Documentation  Taken 3/28/2025 0105 by Chandrika Saldana, RN  Infection Management: aseptic technique maintained  Goal: Optimal Pain Control and Function  Intervention: Prevent or Manage Pain  Recent Flowsheet Documentation  Taken 3/28/2025 0326 by Chandrika Saldana, RN  Pain Management Interventions: medication (see MAR)

## 2025-03-28 NOTE — LACTATION NOTE
This note was copied from a baby's chart.  Corinne reports breastfeeding has been going well- except has had some sleepier feeds since getting circumcision. Reports just finished breastfeeding but Ky was sleepy and did one side. Reports having low milk with her first. Reinforced benefits of hand expression. Discussed starting pumping after any sleepy feeds but that Ky will likely be more active this evening- reviewed some times infants sleepy for couple of feeds after circumcision.  Corinne would like to pump; writer assisted with pump set up, reviewed settings as well as care/cleaning of parts. Suggested finger feeding EBM back after.   All questions answered and primary RN updated.

## 2025-03-28 NOTE — PROVIDER NOTIFICATION
03/28/25 0832   Provider Notification   Provider Name/Title Georgina   Method of Notification Electronic Page   Request Evaluate-Remote   Notification Reason Medication Request     Patient BP 140s/90s this am. MD updated. Procardia increased to 60mg QAM and 30mg QHS

## 2025-03-28 NOTE — DISCHARGE INSTRUCTIONS
"Postpartum Care at Home With Your Baby: Care Instructions  Overview     After childbirth (postpartum period), your body goes through many changes as you recover. In these weeks after delivery, try to take good care of yourself. Get rest whenever you can and accept help from others.  It may take 4 to 6 weeks to feel like yourself again, and possibly longer if you had a  birth. You may feel sore or very tired as you recover. After delivery, you may continue to have contractions as the uterus returns to the size it was before your pregnancy. You will also have some vaginal bleeding. And you may have pain around the vagina as you heal. Several days after delivery you may also have pain and swelling in your breasts as they fill with milk. There are things you can do at home to help ease these discomforts.  After childbirth, it's common to feel emotional. You may feel irritable, cry easily, and feel happy one minute and sad the next. This is called the \"baby blues.\" Hormone changes are one cause of these emotional changes. These feelings usually get better within a couple of weeks. If they don't, talk to your doctor or midwife.  In the first couple of weeks after you give birth, your doctor or midwife may want to check in with you and make a plan for follow-up care. You will likely have a complete postpartum visit in the first 3 months after delivery. At that time, your doctor or midwife will check on your recovery and see how you're doing. But if you have questions or concerns before then, you can always call your doctor or midwife.  Follow-up care is a key part of your treatment and safety. Be sure to make and go to all appointments, and call your doctor if you are having problems. It's also a good idea to know your test results and keep a list of the medicines you take.  How can you care for yourself at home?  Taking care of your body  Use pads instead of tampons for bleeding. After birth, you will have bloody " vaginal discharge. You may also pass some blood clots that shouldn't be bigger than an egg. Over the next 6 weeks or so, your bleeding should decrease a little every day and slowly change to a pinkish and then whitish discharge.  For cramps or mild pain, try an over-the-counter pain medicine, such as acetaminophen (Tylenol) or ibuprofen (Advil, Motrin). Read and follow all instructions on the label.  To ease pain around the vagina or from hemorrhoids:  Put ice or a cold pack on the area for 10 to 20 minutes at a time. Put a thin cloth between the ice and your skin.  Try sitting in a few inches of warm water (sitz bath) when you can or after bowel movements.  Clean yourself with a gentle squeeze of warm water from a bottle instead of wiping with toilet paper.  Use witch hazel or hemorrhoid pads (such as Tucks).  Try using a cold compress for sore and swollen breasts. And wear a supportive bra that fits.  Ease constipation by drinking plenty of fluids and eating high-fiber foods. Ask your doctor or midwife about over-the-counter stool softeners.  Activity  Rest when you can.  Ask for help from family or friends when you need it.  If you can, have another adult in your home for at least 2 or 3 days after birth.  When you feel ready, try to get some exercise every day. For many people, walking is a good choice. Don't do any heavy exercise until your doctor or midwife says it's okay.  Ask your doctor or midwife when it is okay to have vaginal sex.  If you don't want to get pregnant, talk to your doctor or midwife about birth control options. You can get pregnant even before your period returns. Also, you can get pregnant while you are breastfeeding.  Talk to your doctor or midwife if you want to get pregnant again. They can talk to you about when it is safe.  Emotional health  It's normal to have some sadness, anxiety, and mood swings after delivery. It may help to talk with a trusted friend or family member. You can  also call the Maternal Mental Health Hotline at 2-960-ZOY-MAMA (1-139.454.3582) for support. If these mood changes last more than a couple of weeks, talk to your doctor or midwife.  When should you call for help?  Share this information with your partner, family, or a friend. They can help you watch for warning signs.  Call 911  anytime you think you may need emergency care. For example, call if:    You feel you cannot stop from hurting yourself, your baby, or someone else.     You passed out (lost consciousness).     You have chest pain, are short of breath, or cough up blood.     You have a seizure.   Where to get help 24 hours a day, 7 days a week   If you or someone you know talks about suicide, self-harm, a mental health crisis, a substance use crisis, or any other kind of emotional distress, get help right away. You can:    Call the Suicide and Crisis Lifeline at 988.     Call 1-447-719-TALK (1-503.189.1913).     Text HOME to 923450 to access the Crisis Text Line.   Consider saving these numbers in your phone.  Go to [employer] for more information or to chat online.  Call your doctor or midwife now or seek immediate medical care if:    You have signs of hemorrhage (too much bleeding), such as:  Heavy vaginal bleeding. This means that you are soaking through one or more pads in an hour. Or you pass blood clots bigger than an egg.  Feeling dizzy or lightheaded, or you feel like you may faint.  Feeling so tired or weak that you cannot do your usual activities.  A fast or irregular heartbeat.  New or worse belly pain.     You have signs of infection, such as:  A fever.  Increased pain, swelling, warmth, or redness from an incision or wound.  Frequent or painful urination or blood in your urine.  Vaginal discharge that smells bad.  New or worse belly pain.     You have symptoms of a blood clot in your leg (called a deep vein thrombosis), such as:  Pain in the calf, back of the knee, thigh, or groin.  Swelling  "in the leg or groin.  A color change on the leg or groin. The skin may be reddish or purplish, depending on your usual skin color.     You have signs of preeclampsia, such as:  Sudden swelling of your face, hands, or feet.  New vision problems (such as dimness, blurring, or seeing spots).  A severe headache.     You have signs of heart failure, such as:  New or increased shortness of breath.  New or worse swelling in your legs, ankles, or feet.  Sudden weight gain, such as more than 2 to 3 pounds in a day or 5 pounds in a week.  Feeling so tired or weak that you cannot do your usual activities.     You had spinal or epidural pain relief and have:  New or worse back pain.  Increased pain, swelling, warmth, or redness at the injection site.  Tingling, weakness, or numbness in your legs or groin.   Watch closely for changes in your health, and be sure to contact your doctor or midwife if:    Your vaginal bleeding isn't decreasing.     You feel sad, anxious, or hopeless for more than a few days.     You are having problems with your breasts or breastfeeding.   Where can you learn more?  Go to https://www.[employer]/patiented  Enter Z768 in the search box to learn more about \"Postpartum Care at Home With Your Baby: Care Instructions.\"  Current as of: April 30, 2024  Content Version: 14.4 2024-2025 [employer].   Care instructions adapted under license by your healthcare professional. If you have questions about a medical condition or this instruction, always ask your healthcare professional. [employer] disclaims any warranty or liability for your use of this information.      -----------------------------------------------------  BLOOD PRESSURE MONITORING  - please check your blood pressure twice daily (morning/evening)  - write down your blood pressures in a book/phone so we can review them in upcoming visit(s)  - if you have any blood pressure that: systolic (upper number) is 160 or more, " and/or diastolic (lower number) is 110 or above, call us immediately for further instructions. If your blood pressure persists to be severely elevated on subsequent checks done every 15 min, then please direct yourself to the ED for further evaluation.   - pre-eclampsia signs and symptoms you should be aware of are: headache that does not resolve with Tylenol/sleep/caffeine/hydration, spots in your vision, worsening/generalized swelling, right upper and mid upper abdominal pain, tender to the touch and not resolving with Tylenol/Ibuprofen. Please notify us about these symptoms.   - make an appointment in 1 week so we can review your blood pressures and plan next steps in management.

## 2025-03-28 NOTE — PLAN OF CARE
"Goal Outcome Evaluation:      Plan of Care Reviewed With: patient, spouse    Overall Patient Progress: improvingOverall Patient Progress: improving    Outcome Evaluation: Moderate BPs- managed with Procardia, increased this shift    Vital signs within normal limits. Postpartum checks within normal limits - see flow record. Patient eating and drinking normally. Patient able to empty bladder independently and is up ambulating. Patient performing self cares, is able to care for infant and is breastfeeding every 2-3 hours. Patient medicated with ibuprofen and tylenol during the shift for pain. See MAR. Adequate pain control noted by patient. Patient education done, see flow record. Positive attachment behaviors observed with infant. Patient's spouse present this shift. Continue current plan of care.  Anticipate discharge pending BP.       Problem: Adult Inpatient Plan of Care  Goal: Plan of Care Review  Description: The Plan of Care Review/Shift note should be completed every shift.  The Outcome Evaluation is a brief statement about your assessment that the patient is improving, declining, or no change.  This information will be displayed automatically on your shiftnote.  Outcome: Progressing  Flowsheets (Taken 3/28/2025 1040)  Outcome Evaluation: Moderate BPs- managed with Procardia, increased this shift  Plan of Care Reviewed With:   patient   spouse  Overall Patient Progress: improving  Goal: Patient-Specific Goal (Individualized)  Description: You can add care plan individualizations to a care plan. Examples of Individualization might be:  \"Parent requests to be called daily at 9am for status\", \"I have a hard time hearing out of my right ear\", or \"Do not touch me to wake me up as it startlesme\".  Outcome: Progressing  Goal: Absence of Hospital-Acquired Illness or Injury  Outcome: Progressing  Intervention: Prevent Skin Injury  Recent Flowsheet Documentation  Taken 3/28/2025 0832 by Kalina Wray RN  Body " Position: supine, head elevated  Intervention: Prevent Infection  Recent Flowsheet Documentation  Taken 3/28/2025 0832 by Kalina Wray RN  Infection Prevention:   rest/sleep promoted   hand hygiene promoted  Goal: Optimal Comfort and Wellbeing  Outcome: Progressing  Intervention: Provide Person-Centered Care  Recent Flowsheet Documentation  Taken 3/28/2025 0832 by Kalina Wray RN  Trust Relationship/Rapport:   care explained   choices provided   emotional support provided   empathic listening provided   questions answered   questions encouraged   reassurance provided   thoughts/feelings acknowledged  Goal: Readiness for Transition of Care  Outcome: Progressing     Problem: Labor  Goal: Stable Fetal Wellbeing  Intervention: Promote and Monitor Fetal Wellbeing  Recent Flowsheet Documentation  Taken 3/28/2025 0832 by Kalina Wray RN  Body Position: supine, head elevated  Goal: Absence of Infection Signs and Symptoms  Intervention: Prevent or Manage Infection  Recent Flowsheet Documentation  Taken 3/28/2025 0832 by Kalina Wray RN  Infection Prevention:   rest/sleep promoted   hand hygiene promoted     Problem: Postpartum (Vaginal Delivery)  Goal: Successful Parent Role Transition  Outcome: Progressing  Goal: Hemostasis  Outcome: Progressing  Goal: Absence of Infection Signs and Symptoms  Outcome: Progressing  Goal: Anesthesia/Sedation Recovery  Outcome: Progressing  Goal: Optimal Pain Control and Function  Outcome: Progressing  Goal: Effective Urinary Elimination  Outcome: Progressing

## 2025-03-29 VITALS
BODY MASS INDEX: 31.29 KG/M2 | DIASTOLIC BLOOD PRESSURE: 93 MMHG | RESPIRATION RATE: 18 BRPM | OXYGEN SATURATION: 97 % | WEIGHT: 171.08 LBS | TEMPERATURE: 97.1 F | SYSTOLIC BLOOD PRESSURE: 126 MMHG | HEART RATE: 100 BPM

## 2025-03-29 PROCEDURE — 250N000013 HC RX MED GY IP 250 OP 250 PS 637: Performed by: OBSTETRICS & GYNECOLOGY

## 2025-03-29 RX ORDER — NIFEDIPINE 30 MG
30 TABLET, EXTENDED RELEASE ORAL EVERY 24 HOURS
Qty: 30 TABLET | Refills: 0 | Status: SHIPPED | OUTPATIENT
Start: 2025-03-29

## 2025-03-29 RX ADMIN — PSYLLIUM HUSK 1 PACKET: 3.4 POWDER ORAL at 08:27

## 2025-03-29 RX ADMIN — NIFEDIPINE 60 MG: 30 TABLET, FILM COATED, EXTENDED RELEASE ORAL at 08:27

## 2025-03-29 RX ADMIN — IBUPROFEN 800 MG: 800 TABLET, FILM COATED ORAL at 00:12

## 2025-03-29 ASSESSMENT — ACTIVITIES OF DAILY LIVING (ADL)
ADLS_ACUITY_SCORE: 23

## 2025-03-29 NOTE — PLAN OF CARE
Assumed care from 9964-4179. BP stable, monitoring every 2 hours. Patient is complaining of a mild headache, ibuprofen given along with some caffeine. Voiding without difficulty and independent with self cares.    Trial inhaler and cough med  Call me if worse

## 2025-03-29 NOTE — PROVIDER NOTIFICATION
03/29/25 1044   Provider Notification   Provider Name/Title Aurioles   Method of Notification In Department   Request Evaluate-Remote   Notification Reason Other     Reviewed BPs with MD while rounding. Per MD, okay for patient to discharge this afternoon. Patient will need BP cuff for discharge. Discussed with MD who will place orders.

## 2025-03-29 NOTE — PLAN OF CARE
"Vitals stable this shift. Independent with self cares. Pain controlled with Tylenol and ibuprofen with good relief. Blood pressures WNL this shift. Taking scheduled medications. Denies chest pain, visual disturbances or epigastric pain. Headache resolved.    Problem: Adult Inpatient Plan of Care  Goal: Plan of Care Review  Description: The Plan of Care Review/Shift note should be completed every shift.  The Outcome Evaluation is a brief statement about your assessment that the patient is improving, declining, or no change.  This information will be displayed automatically on your shiftnote.  Outcome: Progressing  Goal: Patient-Specific Goal (Individualized)  Description: You can add care plan individualizations to a care plan. Examples of Individualization might be:  \"Parent requests to be called daily at 9am for status\", \"I have a hard time hearing out of my right ear\", or \"Do not touch me to wake me up as it startlesme\".  Outcome: Progressing  Goal: Absence of Hospital-Acquired Illness or Injury  Outcome: Progressing  Intervention: Prevent Skin Injury  Recent Flowsheet Documentation  Taken 3/29/2025 0000 by Lennie El RN  Body Position: position changed independently  Goal: Optimal Comfort and Wellbeing  Outcome: Progressing  Intervention: Provide Person-Centered Care  Recent Flowsheet Documentation  Taken 3/29/2025 0000 by Lennie El RN  Trust Relationship/Rapport:   care explained   choices provided   emotional support provided   empathic listening provided   questions answered   questions encouraged   reassurance provided   thoughts/feelings acknowledged  Goal: Readiness for Transition of Care  Outcome: Progressing   Goal Outcome Evaluation:                        "

## 2025-03-29 NOTE — PROGRESS NOTES
[facility]  PPD# 3    Pt doing well, states she is ready to go home if deem safe. Ambulating, voiding, tolerating PO. Decreased lochia. Pain controlled. Breast feeding and coping well with infant. Denies any signs or symptoms of pre-E.     Vitals:    25 0012 25 0209 25 0407 25 0824   BP: 132/88 118/87 125/90 (!) 126/93   BP Location:    Left arm   Patient Position:    Semi-Boogie's   Cuff Size:    Adult Regular   Pulse: 84 91 92 100   Resp: 18   18   Temp: 98.6  F (37  C)   97.1  F (36.2  C)   TempSrc: Oral   Oral   SpO2:       Weight:         Abd soft, appropriately tender, nondistended, FFBU, no fundal tenderness  Ext 1+ edema bilat LE, no CT BL    Lab Results   Component Value Date    HGB 12.4 2025       A/P 34 year old  at 39w3d s/p VAVD PPD# 3     -Hemodynamically stable   -Rh pos  -Diet; regular  -pain Tylenol and Motrin  -Bowel ppx- Senna/docusate/simethicone  -Rubella immune  -Tdap given prentally  -Breast feeding, encouraged. Continue PNV's. Proper hydration and caloric intake counseled.  -BC; mIUD    #. Gestational Hypertension:  - HELLP labs wnl.  - Increase Nifedipine 60/30  - BPs wnl for the last 24 hrs  - no signs or symptoms of pre-E      #. Disposition:   - Discharge home today  - Follow up in 1wk for BP check and 6wks for full postpartum visit.    Dr. Bolaños   [phone]

## 2025-03-29 NOTE — PLAN OF CARE
"Goal Outcome Evaluation:      Plan of Care Reviewed With: patient, spouse    Overall Patient Progress: improvingOverall Patient Progress: improving    Outcome Evaluation: stable for discharge    Vital signs within normal limits. Postpartum checks within normal limits - see flow record. Patient eating and drinking normally. Patient able to empty bladder independently and is up ambulating. Patient performing self cares, is able to care for infant and is breastfeeding every 2-3 hours. Adequate pain control noted by patient. Patient education done, see flow record. Positive attachment behaviors observed with infant. Patient's spouse present this shift.     Discharge instructions completed.  Patient states she understands all discharge instructions and all her questions have been answered.  Verbalizes when she needs to return to clinic for follow up for herself and baby.  She is caring for herself and her baby independently.  BP cuff given and s/sx of pre-e reviewed. Prescriptions reviewed and sent to pharmacy.  Postpartum depression symptoms reviewed and encouraged frequent review of depression scale. Patient discharged home with family transporting at 1240.      Problem: Adult Inpatient Plan of Care  Goal: Plan of Care Review  Description: The Plan of Care Review/Shift note should be completed every shift.  The Outcome Evaluation is a brief statement about your assessment that the patient is improving, declining, or no change.  This information will be displayed automatically on your shiftnote.  Outcome: Progressing  Flowsheets (Taken 3/29/2025 1236)  Outcome Evaluation: stable for discharge  Plan of Care Reviewed With:   patient   spouse  Overall Patient Progress: improving  Goal: Patient-Specific Goal (Individualized)  Description: You can add care plan individualizations to a care plan. Examples of Individualization might be:  \"Parent requests to be called daily at 9am for status\", \"I have a hard time hearing out of " "my right ear\", or \"Do not touch me to wake me up as it startlesme\".  Outcome: Progressing  Goal: Absence of Hospital-Acquired Illness or Injury  Outcome: Progressing  Intervention: Prevent Skin Injury  Recent Flowsheet Documentation  Taken 3/29/2025 0824 by Kalina Wary RN  Body Position: supine, head elevated  Intervention: Prevent Infection  Recent Flowsheet Documentation  Taken 3/29/2025 0824 by Kalina Wray RN  Infection Prevention:   hand hygiene promoted   rest/sleep promoted  Goal: Optimal Comfort and Wellbeing  Outcome: Progressing  Intervention: Provide Person-Centered Care  Recent Flowsheet Documentation  Taken 3/29/2025 0824 by Kalina Wray RN  Trust Relationship/Rapport:   care explained   choices provided   emotional support provided   empathic listening provided   questions answered   questions encouraged   reassurance provided   thoughts/feelings acknowledged  Goal: Readiness for Transition of Care  Outcome: Progressing  Flowsheets (Taken 3/29/2025 1120)  Anticipated Changes Related to Illness: none  Concerns to be Addressed: all concerns addressed in this encounter  Barriers to Discharge: none  Intervention: Mutually Develop Transition Plan  Recent Flowsheet Documentation  Taken 3/29/2025 1120 by Kalina Wray RN  Discharge Coordination/Progress: done  Anticipated Changes Related to Illness: none  Concerns to be Addressed: all concerns addressed in this encounter     Problem: Labor  Goal: Stable Fetal Wellbeing  Intervention: Promote and Monitor Fetal Wellbeing  Recent Flowsheet Documentation  Taken 3/29/2025 0824 by Kalina Wray RN  Body Position: supine, head elevated  Goal: Absence of Infection Signs and Symptoms  Intervention: Prevent or Manage Infection  Recent Flowsheet Documentation  Taken 3/29/2025 0824 by Kalina Wray RN  Infection Prevention:   hand hygiene promoted   rest/sleep promoted     Problem: Postpartum (Vaginal Delivery)  Goal: Successful Parent " Role Transition  Outcome: Progressing  Goal: Hemostasis  Outcome: Progressing  Goal: Absence of Infection Signs and Symptoms  Outcome: Progressing  Goal: Anesthesia/Sedation Recovery  Outcome: Progressing  Goal: Optimal Pain Control and Function  Outcome: Progressing  Goal: Effective Urinary Elimination  Outcome: Progressing